# Patient Record
Sex: FEMALE | Race: WHITE | NOT HISPANIC OR LATINO | Employment: OTHER | RURAL
[De-identification: names, ages, dates, MRNs, and addresses within clinical notes are randomized per-mention and may not be internally consistent; named-entity substitution may affect disease eponyms.]

---

## 2020-11-01 ENCOUNTER — HISTORICAL (OUTPATIENT)
Dept: ADMINISTRATIVE | Facility: HOSPITAL | Age: 50
End: 2020-11-01

## 2020-11-01 LAB
ALBUMIN SERPL BCP-MCNC: 3.2 G/DL (ref 3.5–5)
ALP SERPL-CCNC: 144 U/L (ref 39–100)
ALT SERPL W P-5'-P-CCNC: 19 U/L (ref 13–56)
ANION GAP SERPL CALCULATED.3IONS-SCNC: 13 MMOL/L
ANISOCYTOSIS BLD QL SMEAR: ABNORMAL
APTT PPP: 27.2 SECONDS (ref 25.2–37.3)
AST SERPL W P-5'-P-CCNC: 18 U/L (ref 15–37)
BASOPHILS # BLD AUTO: 0.01 X10E3/UL (ref 0–0.2)
BASOPHILS NFR BLD AUTO: 0.1 % (ref 0–1)
BILIRUB DIRECT SERPL-MCNC: 0.1 MG/DL (ref 0–0.2)
BILIRUB SERPL-MCNC: 0.4 MG/DL (ref 0–1.2)
BUN SERPL-MCNC: 13 MG/DL (ref 7–18)
CALCIUM SERPL-MCNC: 10.2 MG/DL (ref 8.5–10.1)
CHLORIDE SERPL-SCNC: 99 MMOL/L (ref 98–107)
CHOLEST SERPL-MCNC: 164 MG/DL
CHOLEST/HDLC SERPL: 3.5 {RATIO}
CO2 SERPL-SCNC: 32 MMOL/L (ref 21–32)
CREAT SERPL-MCNC: 0.72 MG/DL (ref 0.55–1.02)
D DIMER PPP FEU-MCNC: 1.39 UG/ML (ref 0–0.47)
EOSINOPHIL # BLD AUTO: 0 X10E3/UL (ref 0–0.5)
EOSINOPHIL NFR BLD AUTO: 0 % (ref 1–4)
ERYTHROCYTE [DISTWIDTH] IN BLOOD BY AUTOMATED COUNT: 17.4 % (ref 11.5–14.5)
GLUCOSE SERPL-MCNC: 138 MG/DL (ref 74–106)
HCT VFR BLD AUTO: 43 % (ref 38–47)
HDLC SERPL-MCNC: 47 MG/DL
HGB BLD-MCNC: 13.5 G/DL (ref 12–16)
IMM GRANULOCYTES # BLD AUTO: 0.04 X10E3/UL (ref 0–0.04)
IMM GRANULOCYTES NFR BLD: 0.4 % (ref 0–0.4)
INR BLD: 1.03 (ref 0–3.3)
LDLC SERPL CALC-MCNC: 102 MG/DL
LYMPHOCYTES # BLD AUTO: 0.63 X10E3/UL (ref 1–4.8)
LYMPHOCYTES NFR BLD AUTO: 5.7 % (ref 27–41)
LYMPHOCYTES NFR BLD MANUAL: 9 % (ref 27–41)
MAGNESIUM SERPL-MCNC: 2.1 MG/DL (ref 1.7–2.3)
MCH RBC QN AUTO: 27.1 PG (ref 27–31)
MCHC RBC AUTO-ENTMCNC: 31.4 G/DL (ref 32–36)
MCV RBC AUTO: 86.3 FL (ref 80–96)
MONOCYTES # BLD AUTO: 0.04 X10E3/UL (ref 0–0.8)
MONOCYTES NFR BLD AUTO: 0.4 % (ref 2–6)
MPC BLD CALC-MCNC: 10.3 FL (ref 9.4–12.4)
NEUTROPHILS # BLD AUTO: 10.26 X10E3/UL (ref 1.8–7.7)
NEUTROPHILS NFR BLD AUTO: 93.4 % (ref 53–65)
NEUTS SEG NFR BLD MANUAL: 91 % (ref 50–62)
NRBC # BLD AUTO: 0 X10E3/UL (ref 0–0)
NRBC, AUTO (.00): 0 /100 (ref 0–0)
NT-PROBNP SERPL-MCNC: 376 PG/ML (ref 1–125)
OVALOCYTES BLD QL SMEAR: ABNORMAL
PLATELET # BLD AUTO: 403 X10E3/UL (ref 150–400)
PLATELET MORPHOLOGY: ABNORMAL
POTASSIUM SERPL-SCNC: 4.9 MMOL/L (ref 3.5–5.1)
PREALB SERPL NEPH-MCNC: 19 MG/DL (ref 20–40)
PROT SERPL-MCNC: 8 G/DL (ref 6.4–8.2)
PROTHROMBIN TIME: 13 SECONDS (ref 11.7–14.7)
RBC # BLD AUTO: 4.98 X10E6/UL (ref 4.2–5.4)
SARS-COV-2 RNA AMPLIFICATION, QUAL: NEGATIVE
SODIUM SERPL-SCNC: 139 MMOL/L (ref 136–145)
T4 SERPL-MCNC: 6.6 UG/DL (ref 4.8–13.9)
TARGETS BLD QL SMEAR: ABNORMAL
TRIGL SERPL-MCNC: 75 MG/DL
TROPONIN I SERPL-MCNC: <0.017 NG/ML (ref 0–0.06)
TSH SERPL DL<=0.005 MIU/L-ACNC: 0.64 UIU/ML (ref 0.36–3.74)
WBC # BLD AUTO: 10.98 X10E3/UL (ref 4.5–11)

## 2020-11-02 ENCOUNTER — HISTORICAL (OUTPATIENT)
Dept: ADMINISTRATIVE | Facility: HOSPITAL | Age: 50
End: 2020-11-02

## 2020-11-02 LAB
ALBUMIN SERPL BCP-MCNC: 3 G/DL (ref 3.5–5)
ALBUMIN/GLOB SERPL: 0.8 {RATIO}
ALP SERPL-CCNC: 108 U/L (ref 39–100)
ALT SERPL W P-5'-P-CCNC: 16 U/L (ref 13–56)
ANION GAP SERPL CALCULATED.3IONS-SCNC: 12 MMOL/L
ANISOCYTOSIS BLD QL SMEAR: ABNORMAL
AST SERPL W P-5'-P-CCNC: 18 U/L (ref 15–37)
BACTERIA #/AREA URNS HPF: ABNORMAL /HPF
BASOPHILS # BLD AUTO: 0.02 X10E3/UL (ref 0–0.2)
BASOPHILS NFR BLD AUTO: 0.1 % (ref 0–1)
BILIRUB SERPL-MCNC: 0.2 MG/DL (ref 0–1.2)
BILIRUB UR QL STRIP: NEGATIVE MG/DL
BUN SERPL-MCNC: 15 MG/DL (ref 7–18)
BUN/CREAT SERPL: 22.4
CALCIUM SERPL-MCNC: 9.2 MG/DL (ref 8.5–10.1)
CHLORIDE SERPL-SCNC: 102 MMOL/L (ref 98–107)
CLARITY UR: ABNORMAL
CLARITY UR: ABNORMAL
CO2 SERPL-SCNC: 29 MMOL/L (ref 21–32)
COLOR UR: ABNORMAL
COLOR UR: ABNORMAL
CREAT SERPL-MCNC: 0.67 MG/DL (ref 0.55–1.02)
EOSINOPHIL # BLD AUTO: 0 X10E3/UL (ref 0–0.5)
EOSINOPHIL NFR BLD AUTO: 0 % (ref 1–4)
ERYTHROCYTE [DISTWIDTH] IN BLOOD BY AUTOMATED COUNT: 17.3 % (ref 11.5–14.5)
GLOBULIN SER-MCNC: 3.9 G/DL (ref 2–4)
GLUCOSE SERPL-MCNC: 153 MG/DL (ref 74–106)
GLUCOSE UR STRIP-MCNC: NEGATIVE MG/DL
HCT VFR BLD AUTO: 36.1 % (ref 38–47)
HGB BLD-MCNC: 11.7 G/DL (ref 12–16)
IMM GRANULOCYTES # BLD AUTO: 0.13 X10E3/UL (ref 0–0.04)
IMM GRANULOCYTES NFR BLD: 0.8 % (ref 0–0.4)
KETONES UR STRIP-SCNC: NEGATIVE MG/DL
LEUKOCYTE ESTERASE UR QL STRIP: ABNORMAL LEU/UL
LYMPHOCYTES # BLD AUTO: 0.5 X10E3/UL (ref 1–4.8)
LYMPHOCYTES NFR BLD AUTO: 2.9 % (ref 27–41)
LYMPHOCYTES NFR BLD MANUAL: 2 % (ref 27–41)
MCH RBC QN AUTO: 27.8 PG (ref 27–31)
MCHC RBC AUTO-ENTMCNC: 32.4 G/DL (ref 32–36)
MCV RBC AUTO: 85.7 FL (ref 80–96)
MONOCYTES # BLD AUTO: 0.37 X10E3/UL (ref 0–0.8)
MONOCYTES NFR BLD AUTO: 2.2 % (ref 2–6)
MONOCYTES NFR BLD MANUAL: 1 % (ref 2–6)
MPC BLD CALC-MCNC: 9.2 FL (ref 9.4–12.4)
MUCOUS THREADS #/AREA URNS HPF: ABNORMAL /HPF
NEUTROPHILS # BLD AUTO: 16.01 X10E3/UL (ref 1.8–7.7)
NEUTROPHILS NFR BLD AUTO: 94 % (ref 53–65)
NEUTS BAND NFR BLD MANUAL: 7 % (ref 1–5)
NEUTS SEG NFR BLD MANUAL: 90 % (ref 50–62)
NITRITE UR QL STRIP: NEGATIVE
NRBC # BLD AUTO: 0 X10E3/UL (ref 0–0)
NRBC, AUTO (.00): 0 /100 (ref 0–0)
PH UR STRIP: 6 PH UNITS (ref 5–8)
PLATELET # BLD AUTO: 462 X10E3/UL (ref 150–400)
PLATELET MORPHOLOGY: ABNORMAL
POLYCHROMASIA BLD QL SMEAR: ABNORMAL
POTASSIUM SERPL-SCNC: 3.8 MMOL/L (ref 3.5–5.1)
PROT SERPL-MCNC: 6.9 G/DL (ref 6.4–8.2)
PROT UR QL STRIP: NEGATIVE MG/DL
RBC # BLD AUTO: 4.21 X10E6/UL (ref 4.2–5.4)
RBC # UR STRIP: ABNORMAL ERY/UL
RBC #/AREA URNS HPF: ABNORMAL /HPF (ref 0–3)
SODIUM SERPL-SCNC: 139 MMOL/L (ref 136–145)
SP GR UR STRIP: 1.02 (ref 1–1.03)
SQUAMOUS #/AREA URNS LPF: ABNORMAL /LPF
TARGETS BLD QL SMEAR: ABNORMAL
UROBILINOGEN UR STRIP-ACNC: 0.2 EU/DL
WBC # BLD AUTO: 17.03 X10E3/UL (ref 4.5–11)
WBC #/AREA URNS HPF: ABNORMAL /HPF (ref 0–5)

## 2020-11-03 ENCOUNTER — HISTORICAL (OUTPATIENT)
Dept: ADMINISTRATIVE | Facility: HOSPITAL | Age: 50
End: 2020-11-03

## 2020-11-03 LAB
ALBUMIN SERPL BCP-MCNC: 3.3 G/DL (ref 3.5–5)
ALBUMIN/GLOB SERPL: 0.8 {RATIO}
ALP SERPL-CCNC: 115 U/L (ref 39–100)
ALT SERPL W P-5'-P-CCNC: 25 U/L (ref 13–56)
ANION GAP SERPL CALCULATED.3IONS-SCNC: 12 MMOL/L
ANISOCYTOSIS BLD QL SMEAR: ABNORMAL
AST SERPL W P-5'-P-CCNC: 20 U/L (ref 15–37)
BASOPHILS # BLD AUTO: 0.02 X10E3/UL (ref 0–0.2)
BASOPHILS NFR BLD AUTO: 0.1 % (ref 0–1)
BILIRUB SERPL-MCNC: 0.2 MG/DL (ref 0–1.2)
BUN SERPL-MCNC: 19 MG/DL (ref 7–18)
BUN/CREAT SERPL: 23.2
CALCIUM SERPL-MCNC: 9.9 MG/DL (ref 8.5–10.1)
CHLORIDE SERPL-SCNC: 103 MMOL/L (ref 98–107)
CO2 SERPL-SCNC: 30 MMOL/L (ref 21–32)
CREAT SERPL-MCNC: 0.82 MG/DL (ref 0.55–1.02)
EOSINOPHIL # BLD AUTO: 0 X10E3/UL (ref 0–0.5)
EOSINOPHIL NFR BLD AUTO: 0 % (ref 1–4)
ERYTHROCYTE [DISTWIDTH] IN BLOOD BY AUTOMATED COUNT: 18.2 % (ref 11.5–14.5)
GLOBULIN SER-MCNC: 4 G/DL (ref 2–4)
GLUCOSE SERPL-MCNC: 70 MG/DL (ref 74–106)
HCT VFR BLD AUTO: 39.9 % (ref 38–47)
HGB BLD-MCNC: 12.6 G/DL (ref 12–16)
HYPOCHROMIA BLD QL SMEAR: SLIGHT
IMM GRANULOCYTES # BLD AUTO: 0.25 X10E3/UL (ref 0–0.04)
IMM GRANULOCYTES NFR BLD: 1.5 % (ref 0–0.4)
LYMPHOCYTES # BLD AUTO: 0.41 X10E3/UL (ref 1–4.8)
LYMPHOCYTES NFR BLD AUTO: 2.4 % (ref 27–41)
LYMPHOCYTES NFR BLD MANUAL: 2 % (ref 27–41)
MCH RBC QN AUTO: 27.6 PG (ref 27–31)
MCHC RBC AUTO-ENTMCNC: 31.6 G/DL (ref 32–36)
MCV RBC AUTO: 87.5 FL (ref 80–96)
MONOCYTES # BLD AUTO: 0.74 X10E3/UL (ref 0–0.8)
MONOCYTES NFR BLD AUTO: 4.4 % (ref 2–6)
MONOCYTES NFR BLD MANUAL: 3 % (ref 2–6)
MPC BLD CALC-MCNC: 9.3 FL (ref 9.4–12.4)
NEUTROPHILS # BLD AUTO: 15.56 X10E3/UL (ref 1.8–7.7)
NEUTROPHILS NFR BLD AUTO: 91.6 % (ref 53–65)
NEUTS BAND NFR BLD MANUAL: 1 % (ref 1–5)
NEUTS SEG NFR BLD MANUAL: 94 % (ref 50–62)
NRBC # BLD AUTO: 0 X10E3/UL (ref 0–0)
NRBC, AUTO (.00): 0 /100 (ref 0–0)
OVALOCYTES BLD QL SMEAR: ABNORMAL
PLATELET # BLD AUTO: 467 X10E3/UL (ref 150–400)
PLATELET MORPHOLOGY: ABNORMAL
POLYCHROMASIA BLD QL SMEAR: ABNORMAL
POTASSIUM SERPL-SCNC: 4.2 MMOL/L (ref 3.5–5.1)
PROT SERPL-MCNC: 7.3 G/DL (ref 6.4–8.2)
RBC # BLD AUTO: 4.56 X10E6/UL (ref 4.2–5.4)
SODIUM SERPL-SCNC: 141 MMOL/L (ref 136–145)
TARGETS BLD QL SMEAR: ABNORMAL
WBC # BLD AUTO: 16.98 X10E3/UL (ref 4.5–11)

## 2020-11-04 ENCOUNTER — HISTORICAL (OUTPATIENT)
Dept: ADMINISTRATIVE | Facility: HOSPITAL | Age: 50
End: 2020-11-04

## 2020-11-04 LAB
ALBUMIN SERPL BCP-MCNC: 3 G/DL (ref 3.5–5)
ALBUMIN/GLOB SERPL: 0.8 {RATIO}
ALP SERPL-CCNC: 99 U/L (ref 39–100)
ALT SERPL W P-5'-P-CCNC: 24 U/L (ref 13–56)
ANION GAP SERPL CALCULATED.3IONS-SCNC: 10 MMOL/L
ANISOCYTOSIS BLD QL SMEAR: ABNORMAL
AST SERPL W P-5'-P-CCNC: 19 U/L (ref 15–37)
BASOPHILS # BLD AUTO: 0.03 X10E3/UL (ref 0–0.2)
BASOPHILS NFR BLD AUTO: 0.2 % (ref 0–1)
BILIRUB SERPL-MCNC: 0.2 MG/DL (ref 0–1.2)
BUN SERPL-MCNC: 19 MG/DL (ref 7–18)
BUN/CREAT SERPL: 27.9
CALCIUM SERPL-MCNC: 9.2 MG/DL (ref 8.5–10.1)
CHLORIDE SERPL-SCNC: 103 MMOL/L (ref 98–107)
CO2 SERPL-SCNC: 30 MMOL/L (ref 21–32)
CREAT SERPL-MCNC: 0.68 MG/DL (ref 0.55–1.02)
EOSINOPHIL # BLD AUTO: 0 X10E3/UL (ref 0–0.5)
EOSINOPHIL NFR BLD AUTO: 0 % (ref 1–4)
ERYTHROCYTE [DISTWIDTH] IN BLOOD BY AUTOMATED COUNT: 17.5 % (ref 11.5–14.5)
GLOBULIN SER-MCNC: 3.8 G/DL (ref 2–4)
GLUCOSE SERPL-MCNC: 114 MG/DL (ref 74–106)
HCT VFR BLD AUTO: 36.7 % (ref 38–47)
HGB BLD-MCNC: 11.7 G/DL (ref 12–16)
HYPOCHROMIA BLD QL SMEAR: SLIGHT
IMM GRANULOCYTES # BLD AUTO: 0.18 X10E3/UL (ref 0–0.04)
IMM GRANULOCYTES NFR BLD: 1.4 % (ref 0–0.4)
LYMPHOCYTES # BLD AUTO: 0.67 X10E3/UL (ref 1–4.8)
LYMPHOCYTES NFR BLD AUTO: 5.2 % (ref 27–41)
LYMPHOCYTES NFR BLD MANUAL: 3 % (ref 27–41)
MCH RBC QN AUTO: 27.7 PG (ref 27–31)
MCHC RBC AUTO-ENTMCNC: 31.9 G/DL (ref 32–36)
MCV RBC AUTO: 86.8 FL (ref 80–96)
MONOCYTES # BLD AUTO: 0.33 X10E3/UL (ref 0–0.8)
MONOCYTES NFR BLD AUTO: 2.6 % (ref 2–6)
MONOCYTES NFR BLD MANUAL: 4 % (ref 2–6)
MPC BLD CALC-MCNC: 9.3 FL (ref 9.4–12.4)
NEUTROPHILS # BLD AUTO: 11.69 X10E3/UL (ref 1.8–7.7)
NEUTROPHILS NFR BLD AUTO: 90.6 % (ref 53–65)
NEUTS SEG NFR BLD MANUAL: 93 % (ref 50–62)
NRBC # BLD AUTO: 0 X10E3/UL (ref 0–0)
NRBC, AUTO (.00): 0 /100 (ref 0–0)
PLATELET # BLD AUTO: 433 X10E3/UL (ref 150–400)
PLATELET MORPHOLOGY: ABNORMAL
POTASSIUM SERPL-SCNC: 4 MMOL/L (ref 3.5–5.1)
PROT SERPL-MCNC: 6.8 G/DL (ref 6.4–8.2)
RBC # BLD AUTO: 4.23 X10E6/UL (ref 4.2–5.4)
REPORT: NORMAL
SODIUM SERPL-SCNC: 139 MMOL/L (ref 136–145)
TARGETS BLD QL SMEAR: ABNORMAL
WBC # BLD AUTO: 12.9 X10E3/UL (ref 4.5–11)

## 2020-11-06 LAB
REPORT: NORMAL

## 2021-01-01 ENCOUNTER — OFFICE VISIT (OUTPATIENT)
Dept: FAMILY MEDICINE | Facility: CLINIC | Age: 51
End: 2021-01-01
Payer: MEDICAID

## 2021-01-01 ENCOUNTER — TELEPHONE (OUTPATIENT)
Dept: PULMONOLOGY | Facility: CLINIC | Age: 51
End: 2021-01-01
Payer: MEDICAID

## 2021-01-01 ENCOUNTER — OFFICE VISIT (OUTPATIENT)
Dept: PULMONOLOGY | Facility: CLINIC | Age: 51
End: 2021-01-01
Payer: MEDICAID

## 2021-01-01 VITALS
OXYGEN SATURATION: 96 % | RESPIRATION RATE: 16 BRPM | WEIGHT: 100 LBS | DIASTOLIC BLOOD PRESSURE: 68 MMHG | HEART RATE: 117 BPM | BODY MASS INDEX: 17.07 KG/M2 | HEIGHT: 64 IN | TEMPERATURE: 99 F | SYSTOLIC BLOOD PRESSURE: 131 MMHG

## 2021-01-01 VITALS
WEIGHT: 100 LBS | RESPIRATION RATE: 20 BRPM | BODY MASS INDEX: 17.07 KG/M2 | HEIGHT: 64 IN | DIASTOLIC BLOOD PRESSURE: 60 MMHG | HEART RATE: 130 BPM | OXYGEN SATURATION: 96 % | SYSTOLIC BLOOD PRESSURE: 90 MMHG

## 2021-01-01 DIAGNOSIS — M26.609 TMJ (TEMPOROMANDIBULAR JOINT DISORDER): ICD-10-CM

## 2021-01-01 DIAGNOSIS — Z23 NEED FOR INFLUENZA VACCINATION: ICD-10-CM

## 2021-01-01 DIAGNOSIS — J44.9 CHRONIC OBSTRUCTIVE PULMONARY DISEASE, UNSPECIFIED COPD TYPE: ICD-10-CM

## 2021-01-01 DIAGNOSIS — Z99.81 OXYGEN DEPENDENT: ICD-10-CM

## 2021-01-01 DIAGNOSIS — Z12.11 SCREENING FOR MALIGNANT NEOPLASM OF COLON: ICD-10-CM

## 2021-01-01 DIAGNOSIS — F17.200 SMOKER: ICD-10-CM

## 2021-01-01 DIAGNOSIS — Z11.59 ENCOUNTER FOR HEPATITIS C SCREENING TEST FOR LOW RISK PATIENT: Primary | ICD-10-CM

## 2021-01-01 DIAGNOSIS — Z12.31 SCREENING MAMMOGRAM FOR BREAST CANCER: ICD-10-CM

## 2021-01-01 DIAGNOSIS — Z72.0 TOBACCO USE: ICD-10-CM

## 2021-01-01 PROCEDURE — 90471 FLU VACCINE (QUAD) GREATER THAN OR EQUAL TO 3YO PRESERVATIVE FREE IM: ICD-10-PCS | Mod: ,,, | Performed by: FAMILY MEDICINE

## 2021-01-01 PROCEDURE — 90471 IMMUNIZATION ADMIN: CPT | Mod: ,,, | Performed by: FAMILY MEDICINE

## 2021-01-01 PROCEDURE — 99204 OFFICE O/P NEW MOD 45 MIN: CPT | Mod: S$PBB,,, | Performed by: INTERNAL MEDICINE

## 2021-01-01 PROCEDURE — 90686 FLU VACCINE (QUAD) GREATER THAN OR EQUAL TO 3YO PRESERVATIVE FREE IM: ICD-10-PCS | Mod: ,,, | Performed by: FAMILY MEDICINE

## 2021-01-01 PROCEDURE — 99203 PR OFFICE/OUTPT VISIT, NEW, LEVL III, 30-44 MIN: ICD-10-PCS | Mod: 25,,, | Performed by: FAMILY MEDICINE

## 2021-01-01 PROCEDURE — 99204 PR OFFICE/OUTPT VISIT, NEW, LEVL IV, 45-59 MIN: ICD-10-PCS | Mod: S$PBB,,, | Performed by: INTERNAL MEDICINE

## 2021-01-01 PROCEDURE — 99214 OFFICE O/P EST MOD 30 MIN: CPT | Mod: PBBFAC | Performed by: INTERNAL MEDICINE

## 2021-01-01 PROCEDURE — 99203 OFFICE O/P NEW LOW 30 MIN: CPT | Mod: 25,,, | Performed by: FAMILY MEDICINE

## 2021-01-01 PROCEDURE — 90686 IIV4 VACC NO PRSV 0.5 ML IM: CPT | Mod: ,,, | Performed by: FAMILY MEDICINE

## 2021-01-01 RX ORDER — THEOPHYLLINE 300 MG/1
300 TABLET, EXTENDED RELEASE ORAL 2 TIMES DAILY
Qty: 60 TABLET | Refills: 5 | Status: SHIPPED | OUTPATIENT
Start: 2021-01-01

## 2021-01-01 RX ORDER — OXYMETAZOLINE HCL 0.05 %
2 SPRAY, NON-AEROSOL (ML) NASAL
COMMUNITY

## 2021-01-01 RX ORDER — MONTELUKAST SODIUM 10 MG/1
10 TABLET ORAL NIGHTLY
Qty: 30 TABLET | Refills: 6 | Status: SHIPPED | OUTPATIENT
Start: 2021-01-01

## 2021-01-01 RX ORDER — IPRATROPIUM BROMIDE AND ALBUTEROL SULFATE 2.5; .5 MG/3ML; MG/3ML
SOLUTION RESPIRATORY (INHALATION)
Qty: 360 ML | Refills: 3 | Status: SHIPPED | OUTPATIENT
Start: 2021-01-01 | End: 2022-01-01 | Stop reason: SDUPTHER

## 2021-01-01 RX ORDER — HYDROXYZINE PAMOATE 50 MG/1
50 CAPSULE ORAL EVERY 8 HOURS PRN
COMMUNITY
End: 2021-01-01 | Stop reason: SDUPTHER

## 2021-01-01 RX ORDER — FUROSEMIDE 20 MG/1
20 TABLET ORAL DAILY PRN
Qty: 30 TABLET | Refills: 5 | Status: SHIPPED | OUTPATIENT
Start: 2021-01-01

## 2021-01-01 RX ORDER — FLUTICASONE PROPIONATE 50 MCG
2 SPRAY, SUSPENSION (ML) NASAL
COMMUNITY

## 2021-01-01 RX ORDER — FLUTICASONE PROPIONATE AND SALMETEROL 500; 50 UG/1; UG/1
1 POWDER RESPIRATORY (INHALATION) 2 TIMES DAILY
COMMUNITY

## 2021-01-01 RX ORDER — MONTELUKAST SODIUM 10 MG/1
10 TABLET ORAL NIGHTLY
COMMUNITY
End: 2021-01-01 | Stop reason: SDUPTHER

## 2021-01-01 RX ORDER — FUROSEMIDE 20 MG/1
20 TABLET ORAL DAILY PRN
COMMUNITY
End: 2021-01-01 | Stop reason: SDUPTHER

## 2021-01-01 RX ORDER — PREDNISONE 20 MG/1
20 TABLET ORAL DAILY
Qty: 3 TABLET | Refills: 0 | Status: SHIPPED | OUTPATIENT
Start: 2021-01-01 | End: 2021-01-01

## 2021-01-01 RX ORDER — PHENYLPROPANOLAMINE/CLEMASTINE 75-1.34MG
2 TABLET, EXTENDED RELEASE ORAL
COMMUNITY

## 2021-01-01 RX ORDER — HYDROXYZINE PAMOATE 50 MG/1
50 CAPSULE ORAL EVERY 8 HOURS PRN
Qty: 270 CAPSULE | Refills: 1 | Status: SHIPPED | OUTPATIENT
Start: 2021-01-01

## 2021-04-16 ENCOUNTER — HISTORICAL (OUTPATIENT)
Dept: ADMINISTRATIVE | Facility: HOSPITAL | Age: 51
End: 2021-04-16

## 2021-04-16 LAB
ALBUMIN SERPL BCP-MCNC: 3.6 G/DL (ref 3.4–5)
ALBUMIN/GLOB SERPL: 1 {RATIO}
ALP SERPL-CCNC: 123 U/L (ref 50–136)
ALT SERPL W P-5'-P-CCNC: 24 U/L (ref 12–78)
ANION GAP SERPL CALCULATED.3IONS-SCNC: 15 MMOL/L
AST SERPL W P-5'-P-CCNC: 18 U/L (ref 15–37)
BASOPHILS # BLD AUTO: 0.03 X10E3/UL (ref 0–0.2)
BASOPHILS NFR BLD AUTO: 0.2 % (ref 0–1)
BILIRUB SERPL-MCNC: 0.3 MG/DL (ref 0.2–1)
BUN SERPL-MCNC: 8 MG/DL (ref 7–18)
CALCIUM SERPL-MCNC: 8.7 MG/DL (ref 8.5–10.1)
CHLORIDE SERPL-SCNC: 99 MMOL/L (ref 101–111)
CO2 SERPL-SCNC: 28 MMOL/L (ref 21–32)
CREAT SERPL-MCNC: 0.5 MG/DL (ref 0.6–1.3)
D DIMER PPP FEU-MCNC: 1.81 UG/ML (ref 0–0.47)
EOSINOPHIL # BLD AUTO: 0.1 X10E3/UL (ref 0–0.5)
EOSINOPHIL NFR BLD AUTO: 0.6 % (ref 1–4)
ERYTHROCYTE [DISTWIDTH] IN BLOOD BY AUTOMATED COUNT: 19.1 % (ref 11.5–14.5)
FLUAV AG UPPER RESP QL IA.RAPID: NEGATIVE
FLUBV AG UPPER RESP QL IA.RAPID: NEGATIVE
GLOBULIN SER-MCNC: 3.7 G/DL
GLUCOSE SERPL-MCNC: 127 MG/DL (ref 74–106)
HCO3 UR-SCNC: 35 MMOL/L (ref 21–28)
HCT VFR BLD AUTO: 42.2 % (ref 38–47)
HGB BLD-MCNC: 13.7 G/DL (ref 12–16)
LACTATE SERPL-SCNC: 2.3 MMOL/L (ref 0.4–2)
LACTATE SERPL-SCNC: 2.7 MMOL/L (ref 0.4–2)
LYMPHOCYTES # BLD AUTO: 2.56 X10E3/UL (ref 1–4.8)
LYMPHOCYTES NFR BLD AUTO: 16.1 % (ref 27–41)
MAGNESIUM SERPL-MCNC: 1.9 MG/DL (ref 1.8–2.4)
MCH RBC QN AUTO: 26.9 PG (ref 27–31)
MCHC RBC AUTO-ENTMCNC: 32.5 G/DL (ref 32–36)
MCV RBC AUTO: 83 FL (ref 80–96)
MONOCYTES # BLD AUTO: 1.25 X10E3/UL (ref 0–0.8)
MONOCYTES NFR BLD AUTO: 7.8 % (ref 2–6)
MPC BLD CALC-MCNC: 10.1 FL (ref 9.4–12.4)
NEUTROPHILS # BLD AUTO: 12 X10E3/UL (ref 1.8–7.7)
NEUTROPHILS NFR BLD AUTO: 75.3 % (ref 53–65)
PCO2 BLDA: 59 MM HG (ref 35–48)
PH SMN: 7.38 PH UNITS (ref 7.35–7.45)
PLATELET # BLD AUTO: 311 X10E3/UL (ref 150–400)
PO2 BLDA: 71 MM HG (ref 83–108)
POC BASE EXCESS ARTERIAL: 7.9 MMOL/L (ref -2–3)
POC SATURATED O2: 94 % (ref 95–98)
POTASSIUM SERPL-SCNC: 4.3 MMOL/L (ref 3.6–5)
PROT SERPL-MCNC: 7.3 G/DL (ref 6.4–8.2)
RBC # BLD AUTO: 5.1 X10E6/UL (ref 4.2–5.4)
SARS-COV+SARS-COV-2 AG RESP QL IA.RAPID: NEGATIVE
SODIUM SERPL-SCNC: 138 MMOL/L (ref 135–145)
TROPONIN I SERPL-MCNC: 0 NG/ML (ref 0–0.06)
WBC # BLD AUTO: 15.94 X10E3/UL (ref 4.5–11)

## 2021-04-17 ENCOUNTER — HISTORICAL (OUTPATIENT)
Dept: ADMINISTRATIVE | Facility: HOSPITAL | Age: 51
End: 2021-04-17

## 2021-04-17 LAB
ANION GAP SERPL CALCULATED.3IONS-SCNC: 9 MMOL/L
BASOPHILS # BLD AUTO: 0.01 X10E3/UL (ref 0–0.2)
BASOPHILS NFR BLD AUTO: 0.1 % (ref 0–1)
BUN SERPL-MCNC: 17 MG/DL (ref 7–18)
CALCIUM SERPL-MCNC: 9.6 MG/DL (ref 8.5–10.1)
CHLORIDE SERPL-SCNC: 100 MMOL/L (ref 101–111)
CO2 SERPL-SCNC: 32 MMOL/L (ref 21–32)
CREAT SERPL-MCNC: 0.6 MG/DL (ref 0.6–1.3)
EOSINOPHIL # BLD AUTO: 0 X10E3/UL (ref 0–0.5)
EOSINOPHIL NFR BLD AUTO: 0 % (ref 1–4)
ERYTHROCYTE [DISTWIDTH] IN BLOOD BY AUTOMATED COUNT: 18.3 % (ref 11.5–14.5)
GLUCOSE SERPL-MCNC: 125 MG/DL (ref 74–106)
HCT VFR BLD AUTO: 38.1 % (ref 38–47)
HGB BLD-MCNC: 12.5 G/DL (ref 12–16)
LYMPHOCYTES # BLD AUTO: 0.66 X10E3/UL (ref 1–4.8)
LYMPHOCYTES NFR BLD AUTO: 5.8 % (ref 27–41)
LYMPHOCYTES NFR BLD MANUAL: 7 % (ref 27–41)
MCH RBC QN AUTO: 27.4 PG (ref 27–31)
MCHC RBC AUTO-ENTMCNC: 32.8 G/DL (ref 32–36)
MCV RBC AUTO: 84 FL (ref 80–96)
MONOCYTES # BLD AUTO: 0.3 X10E3/UL (ref 0–0.8)
MONOCYTES NFR BLD AUTO: 2.6 % (ref 2–6)
MONOCYTES NFR BLD MANUAL: 4 % (ref 2–6)
MPC BLD CALC-MCNC: 9 FL (ref 9.4–12.4)
NEUTROPHILS # BLD AUTO: 10.41 X10E3/UL (ref 1.8–7.7)
NEUTROPHILS NFR BLD AUTO: 91.5 % (ref 53–65)
NEUTS SEG NFR BLD MANUAL: 89 % (ref 50–62)
PLATELET # BLD AUTO: 418 X10E3/UL (ref 150–400)
POTASSIUM SERPL-SCNC: 4.5 MMOL/L (ref 3.6–5)
RBC # BLD AUTO: 4.56 X10E6/UL (ref 4.2–5.4)
SODIUM SERPL-SCNC: 136 MMOL/L (ref 135–145)
THEOPHYLLINE BLD-MCNC: 2.7 UG/ML (ref 10–20)
TSH SERPL DL<=0.005 MIU/L-ACNC: 0.51 UIU/ML (ref 0.36–3.74)
WBC # BLD AUTO: 11.38 X10E3/UL (ref 4.5–11)

## 2021-04-18 ENCOUNTER — HISTORICAL (OUTPATIENT)
Dept: ADMINISTRATIVE | Facility: HOSPITAL | Age: 51
End: 2021-04-18

## 2021-04-18 LAB
ANION GAP SERPL CALCULATED.3IONS-SCNC: 10 MMOL/L
BASOPHILS # BLD AUTO: 0.01 X10E3/UL (ref 0–0.2)
BASOPHILS NFR BLD AUTO: 0.1 % (ref 0–1)
BUN SERPL-MCNC: 19 MG/DL (ref 7–18)
CALCIUM SERPL-MCNC: 8.8 MG/DL (ref 8.5–10.1)
CHLORIDE SERPL-SCNC: 103 MMOL/L (ref 101–111)
CO2 SERPL-SCNC: 30 MMOL/L (ref 21–32)
CREAT SERPL-MCNC: 0.7 MG/DL (ref 0.6–1.3)
EOSINOPHIL # BLD AUTO: 0 X10E3/UL (ref 0–0.5)
EOSINOPHIL NFR BLD AUTO: 0 % (ref 1–4)
ERYTHROCYTE [DISTWIDTH] IN BLOOD BY AUTOMATED COUNT: 18.3 % (ref 11.5–14.5)
GLUCOSE SERPL-MCNC: 99 MG/DL (ref 74–106)
HCT VFR BLD AUTO: 35.2 % (ref 38–47)
HGB BLD-MCNC: 11.4 G/DL (ref 12–16)
LYMPHOCYTES # BLD AUTO: 0.75 X10E3/UL (ref 1–4.8)
LYMPHOCYTES NFR BLD AUTO: 5.6 % (ref 27–41)
LYMPHOCYTES NFR BLD MANUAL: 5 % (ref 27–41)
MCH RBC QN AUTO: 27.3 PG (ref 27–31)
MCHC RBC AUTO-ENTMCNC: 32.4 G/DL (ref 32–36)
MCV RBC AUTO: 84 FL (ref 80–96)
MONOCYTES # BLD AUTO: 0.54 X10E3/UL (ref 0–0.8)
MONOCYTES NFR BLD AUTO: 4 % (ref 2–6)
MONOCYTES NFR BLD MANUAL: 2 % (ref 2–6)
MPC BLD CALC-MCNC: 9.1 FL (ref 9.4–12.4)
NEUTROPHILS # BLD AUTO: 12.13 X10E3/UL (ref 1.8–7.7)
NEUTROPHILS NFR BLD AUTO: 90.3 % (ref 53–65)
NEUTS SEG NFR BLD MANUAL: 93 % (ref 50–62)
PLATELET # BLD AUTO: 427 X10E3/UL (ref 150–400)
POTASSIUM SERPL-SCNC: 3.8 MMOL/L (ref 3.6–5)
RBC # BLD AUTO: 4.17 X10E6/UL (ref 4.2–5.4)
SODIUM SERPL-SCNC: 139 MMOL/L (ref 135–145)
WBC # BLD AUTO: 13.43 X10E3/UL (ref 4.5–11)

## 2021-04-19 ENCOUNTER — HISTORICAL (OUTPATIENT)
Dept: ADMINISTRATIVE | Facility: HOSPITAL | Age: 51
End: 2021-04-19

## 2021-04-19 LAB
ANION GAP SERPL CALCULATED.3IONS-SCNC: 10 MMOL/L
BASOPHILS # BLD AUTO: 0 X10E3/UL (ref 0–0.2)
BASOPHILS NFR BLD AUTO: 0 % (ref 0–1)
BUN SERPL-MCNC: 15 MG/DL (ref 7–18)
CALCIUM SERPL-MCNC: 8.7 MG/DL (ref 8.5–10.1)
CHLORIDE SERPL-SCNC: 102 MMOL/L (ref 101–111)
CO2 SERPL-SCNC: 28 MMOL/L (ref 21–32)
CREAT SERPL-MCNC: 0.6 MG/DL (ref 0.6–1.3)
EOSINOPHIL # BLD AUTO: 0 X10E3/UL (ref 0–0.5)
EOSINOPHIL NFR BLD AUTO: 0 % (ref 1–4)
ERYTHROCYTE [DISTWIDTH] IN BLOOD BY AUTOMATED COUNT: 18.4 % (ref 11.5–14.5)
GLUCOSE SERPL-MCNC: 98 MG/DL (ref 74–106)
HCT VFR BLD AUTO: 34.2 % (ref 38–47)
HGB BLD-MCNC: 10.9 G/DL (ref 12–16)
LYMPHOCYTES # BLD AUTO: 1.24 X10E3/UL (ref 1–4.8)
LYMPHOCYTES NFR BLD AUTO: 13.8 % (ref 27–41)
MCH RBC QN AUTO: 27.3 PG (ref 27–31)
MCHC RBC AUTO-ENTMCNC: 31.9 G/DL (ref 32–36)
MCV RBC AUTO: 86 FL (ref 80–96)
MONOCYTES # BLD AUTO: 0.54 X10E3/UL (ref 0–0.8)
MONOCYTES NFR BLD AUTO: 6 % (ref 2–6)
MPC BLD CALC-MCNC: 9.1 FL (ref 9.4–12.4)
NEUTROPHILS # BLD AUTO: 7.23 X10E3/UL (ref 1.8–7.7)
NEUTROPHILS NFR BLD AUTO: 80.2 % (ref 53–65)
PLATELET # BLD AUTO: 383 X10E3/UL (ref 150–400)
POTASSIUM SERPL-SCNC: 3.7 MMOL/L (ref 3.6–5)
RBC # BLD AUTO: 4 X10E6/UL (ref 4.2–5.4)
SODIUM SERPL-SCNC: 136 MMOL/L (ref 135–145)
WBC # BLD AUTO: 9.01 X10E3/UL (ref 4.5–11)

## 2021-04-22 VITALS — WEIGHT: 110 LBS | HEIGHT: 64 IN | BODY MASS INDEX: 18.78 KG/M2

## 2021-04-22 DIAGNOSIS — J44.9 CHRONIC OBSTRUCTIVE PULMONARY DISEASE, UNSPECIFIED COPD TYPE: ICD-10-CM

## 2021-04-22 DIAGNOSIS — R09.02 HYPOXIA: ICD-10-CM

## 2021-04-22 DIAGNOSIS — A31.0 MYCOBACTERIUM AVIUM-INTRACELLULARE INFECTION: Primary | ICD-10-CM

## 2021-04-22 DIAGNOSIS — A31.0 PULMONARY MYCOBACTERIUM AVIUM COMPLEX (MAC) INFECTION: Primary | ICD-10-CM

## 2021-04-22 LAB
REPORT: NORMAL

## 2021-04-22 RX ORDER — TIOTROPIUM BROMIDE 18 UG/1
18 CAPSULE ORAL; RESPIRATORY (INHALATION) DAILY
COMMUNITY

## 2021-04-22 RX ORDER — CLARITHROMYCIN 500 MG/1
1000 TABLET, FILM COATED, EXTENDED RELEASE ORAL DAILY
COMMUNITY
End: 2021-01-01

## 2021-04-22 RX ORDER — ETHAMBUTOL HYDROCHLORIDE 400 MG/1
800 TABLET, FILM COATED ORAL DAILY
COMMUNITY

## 2021-04-22 RX ORDER — LEVOFLOXACIN 500 MG/1
500 TABLET, FILM COATED ORAL DAILY
COMMUNITY
End: 2021-01-01

## 2021-04-22 RX ORDER — PREDNISONE 20 MG/1
20 TABLET ORAL DAILY
COMMUNITY
End: 2021-01-01 | Stop reason: SDUPTHER

## 2021-04-22 RX ORDER — IBUPROFEN 200 MG
1 TABLET ORAL
COMMUNITY

## 2021-04-22 RX ORDER — THEOPHYLLINE 300 MG/1
300 TABLET, EXTENDED RELEASE ORAL 2 TIMES DAILY
COMMUNITY
End: 2021-01-01 | Stop reason: SDUPTHER

## 2021-04-22 RX ORDER — FLUTICASONE PROPIONATE AND SALMETEROL 250; 50 UG/1; UG/1
1 POWDER RESPIRATORY (INHALATION) 2 TIMES DAILY
COMMUNITY
End: 2021-01-01

## 2021-04-22 RX ORDER — RIFAMPIN 300 MG/1
600 CAPSULE ORAL DAILY
COMMUNITY
End: 2021-01-01

## 2021-04-22 RX ORDER — VARENICLINE TARTRATE 1 MG/1
1 TABLET, FILM COATED ORAL 2 TIMES DAILY
COMMUNITY
End: 2021-01-01

## 2021-05-04 RX ORDER — IPRATROPIUM BROMIDE AND ALBUTEROL SULFATE 2.5; .5 MG/3ML; MG/3ML
3 SOLUTION RESPIRATORY (INHALATION) DAILY PRN
COMMUNITY
Start: 2021-03-01 | End: 2021-09-08

## 2021-05-04 RX ORDER — IPRATROPIUM BROMIDE 0.5 MG/2.5ML
2.5 SOLUTION RESPIRATORY (INHALATION) DAILY
COMMUNITY
Start: 2021-04-01 | End: 2021-09-08

## 2021-05-05 ENCOUNTER — TELEPHONE (OUTPATIENT)
Dept: PULMONOLOGY | Facility: CLINIC | Age: 51
End: 2021-05-05

## 2021-08-26 ENCOUNTER — TELEPHONE (OUTPATIENT)
Dept: PULMONOLOGY | Facility: CLINIC | Age: 51
End: 2021-08-26

## 2021-09-24 ENCOUNTER — TELEPHONE (OUTPATIENT)
Dept: PULMONOLOGY | Facility: CLINIC | Age: 51
End: 2021-09-24

## 2021-10-08 ENCOUNTER — TELEPHONE (OUTPATIENT)
Dept: PULMONOLOGY | Facility: CLINIC | Age: 51
End: 2021-10-08

## 2021-10-08 DIAGNOSIS — J44.9 CHRONIC OBSTRUCTIVE PULMONARY DISEASE, UNSPECIFIED COPD TYPE: Primary | ICD-10-CM

## 2021-10-08 DIAGNOSIS — R09.02 HYPOXIA: ICD-10-CM

## 2021-10-08 DIAGNOSIS — A31.0 MYCOBACTERIUM AVIUM-INTRACELLULARE INFECTION: ICD-10-CM

## 2021-11-10 PROBLEM — R00.0 TACHYCARDIA: Status: ACTIVE | Noted: 2019-04-04

## 2021-11-10 PROBLEM — J44.9 CHRONIC OBSTRUCTIVE LUNG DISEASE: Status: ACTIVE | Noted: 2019-04-04

## 2021-11-10 PROBLEM — F41.1 GENERALIZED ANXIETY DISORDER: Status: ACTIVE | Noted: 2019-04-04

## 2021-11-10 PROBLEM — E55.9 VITAMIN D DEFICIENCY: Status: ACTIVE | Noted: 2020-05-07

## 2021-11-10 PROBLEM — F39 UNSPECIFIED MOOD (AFFECTIVE) DISORDER: Status: ACTIVE | Noted: 2017-08-02

## 2021-11-10 PROBLEM — R25.2 SPASM: Status: ACTIVE | Noted: 2019-04-04

## 2021-11-17 PROBLEM — Z72.0 TOBACCO USE: Status: ACTIVE | Noted: 2021-01-01

## 2022-01-01 ENCOUNTER — HOSPITAL ENCOUNTER (INPATIENT)
Facility: HOSPITAL | Age: 52
LOS: 2 days | Discharge: LEFT BEFORE DISCHARGE | End: 2022-04-17
Attending: FAMILY MEDICINE | Admitting: FAMILY MEDICINE
Payer: MEDICAID

## 2022-01-01 ENCOUNTER — TELEPHONE (OUTPATIENT)
Dept: PULMONOLOGY | Facility: CLINIC | Age: 52
End: 2022-01-01
Payer: MEDICAID

## 2022-01-01 VITALS
HEART RATE: 113 BPM | DIASTOLIC BLOOD PRESSURE: 70 MMHG | WEIGHT: 97.19 LBS | RESPIRATION RATE: 22 BRPM | HEIGHT: 64 IN | TEMPERATURE: 98 F | BODY MASS INDEX: 16.59 KG/M2 | SYSTOLIC BLOOD PRESSURE: 104 MMHG | OXYGEN SATURATION: 100 %

## 2022-01-01 DIAGNOSIS — J18.9 PNEUMONIA OF LEFT UPPER LOBE DUE TO INFECTIOUS ORGANISM: ICD-10-CM

## 2022-01-01 DIAGNOSIS — J44.9 CHRONIC OBSTRUCTIVE PULMONARY DISEASE, UNSPECIFIED COPD TYPE: ICD-10-CM

## 2022-01-01 DIAGNOSIS — J96.01 SEPSIS WITH ACUTE HYPOXIC RESPIRATORY FAILURE WITHOUT SEPTIC SHOCK, DUE TO UNSPECIFIED ORGANISM: ICD-10-CM

## 2022-01-01 DIAGNOSIS — J96.01 ACUTE HYPOXEMIC RESPIRATORY FAILURE: ICD-10-CM

## 2022-01-01 DIAGNOSIS — J18.9 PNEUMONIA OF LEFT LUNG DUE TO INFECTIOUS ORGANISM, UNSPECIFIED PART OF LUNG: Primary | ICD-10-CM

## 2022-01-01 DIAGNOSIS — I49.9 ARRHYTHMIA: ICD-10-CM

## 2022-01-01 DIAGNOSIS — J44.1 COPD EXACERBATION: ICD-10-CM

## 2022-01-01 DIAGNOSIS — R65.20 SEPSIS WITH ACUTE HYPOXIC RESPIRATORY FAILURE WITHOUT SEPTIC SHOCK, DUE TO UNSPECIFIED ORGANISM: ICD-10-CM

## 2022-01-01 DIAGNOSIS — A41.9 SEPSIS WITH ACUTE HYPOXIC RESPIRATORY FAILURE WITHOUT SEPTIC SHOCK, DUE TO UNSPECIFIED ORGANISM: ICD-10-CM

## 2022-01-01 LAB
ALBUMIN SERPL BCP-MCNC: 2.3 G/DL (ref 3.5–5)
ALBUMIN/GLOB SERPL: 0.5 {RATIO}
ALP SERPL-CCNC: 183 U/L (ref 41–108)
ALT SERPL W P-5'-P-CCNC: 21 U/L (ref 13–56)
ANION GAP SERPL CALCULATED.3IONS-SCNC: 8 MMOL/L (ref 7–16)
ANION GAP SERPL CALCULATED.3IONS-SCNC: 9 MMOL/L (ref 7–16)
ANION GAP SERPL CALCULATED.3IONS-SCNC: 9 MMOL/L (ref 7–16)
AST SERPL W P-5'-P-CCNC: 18 U/L (ref 15–37)
BACTERIA BLD CULT: NORMAL
BACTERIA BLD CULT: NORMAL
BASOPHILS # BLD AUTO: 0.01 K/UL (ref 0–0.2)
BASOPHILS # BLD AUTO: 0.01 K/UL (ref 0–0.2)
BASOPHILS # BLD AUTO: 0.02 K/UL (ref 0–0.2)
BASOPHILS NFR BLD AUTO: 0.1 % (ref 0–1)
BILIRUB SERPL-MCNC: 0.2 MG/DL (ref 0–1.2)
BUN SERPL-MCNC: 15 MG/DL (ref 7–18)
BUN SERPL-MCNC: 17 MG/DL (ref 7–18)
BUN SERPL-MCNC: 19 MG/DL (ref 7–18)
BUN/CREAT SERPL: 27 (ref 6–20)
BUN/CREAT SERPL: 33 (ref 6–20)
BUN/CREAT SERPL: 38 (ref 6–20)
CALCIUM SERPL-MCNC: 8.6 MG/DL (ref 8.5–10.1)
CALCIUM SERPL-MCNC: 8.7 MG/DL (ref 8.5–10.1)
CALCIUM SERPL-MCNC: 8.9 MG/DL (ref 8.5–10.1)
CHLORIDE SERPL-SCNC: 87 MMOL/L (ref 98–107)
CHLORIDE SERPL-SCNC: 93 MMOL/L (ref 98–107)
CHLORIDE SERPL-SCNC: 95 MMOL/L (ref 98–107)
CO2 SERPL-SCNC: 35 MMOL/L (ref 21–32)
CO2 SERPL-SCNC: 37 MMOL/L (ref 21–32)
CO2 SERPL-SCNC: 42 MMOL/L (ref 21–32)
CREAT SERPL-MCNC: 0.5 MG/DL (ref 0.55–1.02)
CREAT SERPL-MCNC: 0.51 MG/DL (ref 0.55–1.02)
CREAT SERPL-MCNC: 0.55 MG/DL (ref 0.55–1.02)
D DIMER PPP FEU-MCNC: 6.16 ΜG/ML (ref 0–0.47)
DIFFERENTIAL METHOD BLD: ABNORMAL
EOSINOPHIL # BLD AUTO: 0 K/UL (ref 0–0.5)
EOSINOPHIL # BLD AUTO: 0.01 K/UL (ref 0–0.5)
EOSINOPHIL # BLD AUTO: 0.01 K/UL (ref 0–0.5)
EOSINOPHIL NFR BLD AUTO: 0 % (ref 1–4)
EOSINOPHIL NFR BLD AUTO: 0.1 % (ref 1–4)
EOSINOPHIL NFR BLD AUTO: 0.1 % (ref 1–4)
ERYTHROCYTE [DISTWIDTH] IN BLOOD BY AUTOMATED COUNT: 15 % (ref 11.5–14.5)
ERYTHROCYTE [DISTWIDTH] IN BLOOD BY AUTOMATED COUNT: 15 % (ref 11.5–14.5)
ERYTHROCYTE [DISTWIDTH] IN BLOOD BY AUTOMATED COUNT: 15.4 % (ref 11.5–14.5)
FLUAV AG UPPER RESP QL IA.RAPID: NEGATIVE
FLUBV AG UPPER RESP QL IA.RAPID: NEGATIVE
GLOBULIN SER-MCNC: 4.7 G/DL (ref 2–4)
GLUCOSE SERPL-MCNC: 112 MG/DL (ref 74–106)
GLUCOSE SERPL-MCNC: 128 MG/DL (ref 74–106)
GLUCOSE SERPL-MCNC: 99 MG/DL (ref 74–106)
HCT VFR BLD AUTO: 34.7 % (ref 38–47)
HCT VFR BLD AUTO: 35 % (ref 38–47)
HCT VFR BLD AUTO: 39.7 % (ref 38–47)
HGB BLD-MCNC: 11.2 G/DL (ref 12–16)
HGB BLD-MCNC: 11.3 G/DL (ref 12–16)
HGB BLD-MCNC: 12.3 G/DL (ref 12–16)
LACTATE SERPL-SCNC: 1 MMOL/L (ref 0.4–2)
LACTATE SERPL-SCNC: 2.2 MMOL/L (ref 0.4–2)
LYMPHOCYTES # BLD AUTO: 0.34 K/UL (ref 1–4.8)
LYMPHOCYTES # BLD AUTO: 0.35 K/UL (ref 1–4.8)
LYMPHOCYTES # BLD AUTO: 0.48 K/UL (ref 1–4.8)
LYMPHOCYTES NFR BLD AUTO: 1.8 % (ref 27–41)
LYMPHOCYTES NFR BLD AUTO: 2.6 % (ref 27–41)
LYMPHOCYTES NFR BLD AUTO: 3.1 % (ref 27–41)
LYMPHOCYTES NFR BLD MANUAL: 1 % (ref 27–41)
LYMPHOCYTES NFR BLD MANUAL: 2 % (ref 27–41)
LYMPHOCYTES NFR BLD MANUAL: 2 % (ref 27–41)
MCH RBC QN AUTO: 28.7 PG (ref 27–31)
MCH RBC QN AUTO: 28.8 PG (ref 27–31)
MCH RBC QN AUTO: 29 PG (ref 27–31)
MCHC RBC AUTO-ENTMCNC: 31 G/DL (ref 32–36)
MCHC RBC AUTO-ENTMCNC: 32.3 G/DL (ref 32–36)
MCHC RBC AUTO-ENTMCNC: 32.3 G/DL (ref 32–36)
MCV RBC AUTO: 89.2 FL (ref 80–96)
MCV RBC AUTO: 90 FL (ref 80–96)
MCV RBC AUTO: 92.5 FL (ref 80–96)
MONOCYTES # BLD AUTO: 0.26 K/UL (ref 0–0.8)
MONOCYTES # BLD AUTO: 0.55 K/UL (ref 0–0.8)
MONOCYTES # BLD AUTO: 0.84 K/UL (ref 0–0.8)
MONOCYTES NFR BLD AUTO: 1.7 % (ref 2–6)
MONOCYTES NFR BLD AUTO: 4.2 % (ref 2–6)
MONOCYTES NFR BLD AUTO: 4.4 % (ref 2–6)
MONOCYTES NFR BLD MANUAL: 1 % (ref 2–6)
MONOCYTES NFR BLD MANUAL: 3 % (ref 2–6)
MONOCYTES NFR BLD MANUAL: 5 % (ref 2–6)
MPC BLD CALC-MCNC: 8.2 FL (ref 9.4–12.4)
MPC BLD CALC-MCNC: 8.6 FL (ref 9.4–12.4)
MPC BLD CALC-MCNC: 8.6 FL (ref 9.4–12.4)
NEUTROPHILS # BLD AUTO: 12.06 K/UL (ref 1.8–7.7)
NEUTROPHILS # BLD AUTO: 14.9 K/UL (ref 1.8–7.7)
NEUTROPHILS # BLD AUTO: 17.74 K/UL (ref 1.8–7.7)
NEUTROPHILS NFR BLD AUTO: 93.1 % (ref 53–65)
NEUTROPHILS NFR BLD AUTO: 93.6 % (ref 53–65)
NEUTROPHILS NFR BLD AUTO: 95 % (ref 53–65)
NEUTS BAND NFR BLD MANUAL: 16 % (ref 1–5)
NEUTS SEG NFR BLD MANUAL: 79 % (ref 50–62)
NEUTS SEG NFR BLD MANUAL: 93 % (ref 50–62)
NEUTS SEG NFR BLD MANUAL: 98 % (ref 50–62)
NRBC BLD MANUAL-RTO: ABNORMAL %
NT-PROBNP SERPL-MCNC: 442 PG/ML (ref 1–125)
PLATELET # BLD AUTO: 460 K/UL (ref 150–400)
PLATELET # BLD AUTO: 477 K/UL (ref 150–400)
PLATELET # BLD AUTO: 504 K/UL (ref 150–400)
POTASSIUM SERPL-SCNC: 3.8 MMOL/L (ref 3.5–5.1)
POTASSIUM SERPL-SCNC: 3.8 MMOL/L (ref 3.5–5.1)
POTASSIUM SERPL-SCNC: 4.8 MMOL/L (ref 3.5–5.1)
PROT SERPL-MCNC: 7 G/DL (ref 6.4–8.2)
RBC # BLD AUTO: 3.89 M/UL (ref 4.2–5.4)
RBC # BLD AUTO: 3.89 M/UL (ref 4.2–5.4)
RBC # BLD AUTO: 4.29 M/UL (ref 4.2–5.4)
SARS-COV+SARS-COV-2 AG RESP QL IA.RAPID: NEGATIVE
SODIUM SERPL-SCNC: 132 MMOL/L (ref 136–145)
SODIUM SERPL-SCNC: 135 MMOL/L (ref 136–145)
SODIUM SERPL-SCNC: 135 MMOL/L (ref 136–145)
THEOPHYLLINE BLD-MCNC: 6.7 ΜG/ML (ref 5–20)
TOBRAMYCIN TROUGH SERPL-MCNC: <0.3 ΜG/ML (ref 0–1.9)
TROPONIN I SERPL HS-MCNC: 10.9 PG/ML
VANCOMYCIN TROUGH SERPL-MCNC: 11.5 ΜG/ML (ref 10–20)
WBC # BLD AUTO: 12.96 K/UL (ref 4.5–11)
WBC # BLD AUTO: 15.66 K/UL (ref 4.5–11)
WBC # BLD AUTO: 18.96 K/UL (ref 4.5–11)

## 2022-01-01 PROCEDURE — 36415 COLL VENOUS BLD VENIPUNCTURE: CPT | Performed by: FAMILY MEDICINE

## 2022-01-01 PROCEDURE — 93005 ELECTROCARDIOGRAM TRACING: CPT

## 2022-01-01 PROCEDURE — 25500020 PHARM REV CODE 255: Performed by: FAMILY MEDICINE

## 2022-01-01 PROCEDURE — 80202 ASSAY OF VANCOMYCIN: CPT | Performed by: FAMILY MEDICINE

## 2022-01-01 PROCEDURE — 94761 N-INVAS EAR/PLS OXIMETRY MLT: CPT

## 2022-01-01 PROCEDURE — 25000242 PHARM REV CODE 250 ALT 637 W/ HCPCS: Performed by: FAMILY MEDICINE

## 2022-01-01 PROCEDURE — 83880 ASSAY OF NATRIURETIC PEPTIDE: CPT | Performed by: FAMILY MEDICINE

## 2022-01-01 PROCEDURE — 99284 EMERGENCY DEPT VISIT MOD MDM: CPT | Mod: ,,, | Performed by: FAMILY MEDICINE

## 2022-01-01 PROCEDURE — 99284 PR EMERGENCY DEPT VISIT,LEVEL IV: ICD-10-PCS | Mod: ,,, | Performed by: FAMILY MEDICINE

## 2022-01-01 PROCEDURE — 94640 AIRWAY INHALATION TREATMENT: CPT

## 2022-01-01 PROCEDURE — 25000242 PHARM REV CODE 250 ALT 637 W/ HCPCS: Performed by: REGISTERED NURSE

## 2022-01-01 PROCEDURE — 63600175 PHARM REV CODE 636 W HCPCS: Performed by: FAMILY MEDICINE

## 2022-01-01 PROCEDURE — S0073 INJECTION, AZTREONAM, 500 MG: HCPCS | Performed by: FAMILY MEDICINE

## 2022-01-01 PROCEDURE — 99223 1ST HOSP IP/OBS HIGH 75: CPT | Mod: ,,, | Performed by: FAMILY MEDICINE

## 2022-01-01 PROCEDURE — 25000003 PHARM REV CODE 250: Performed by: FAMILY MEDICINE

## 2022-01-01 PROCEDURE — 11000001 HC ACUTE MED/SURG PRIVATE ROOM

## 2022-01-01 PROCEDURE — 27000221 HC OXYGEN, UP TO 24 HOURS

## 2022-01-01 PROCEDURE — 87040 BLOOD CULTURE FOR BACTERIA: CPT | Performed by: FAMILY MEDICINE

## 2022-01-01 PROCEDURE — 93010 EKG 12-LEAD: ICD-10-PCS | Mod: ,,, | Performed by: INTERNAL MEDICINE

## 2022-01-01 PROCEDURE — 36592 COLLECT BLOOD FROM PICC: CPT | Performed by: FAMILY MEDICINE

## 2022-01-01 PROCEDURE — 99285 EMERGENCY DEPT VISIT HI MDM: CPT | Mod: 25

## 2022-01-01 PROCEDURE — 85025 COMPLETE CBC W/AUTO DIFF WBC: CPT | Performed by: FAMILY MEDICINE

## 2022-01-01 PROCEDURE — 25000003 PHARM REV CODE 250: Performed by: REGISTERED NURSE

## 2022-01-01 PROCEDURE — 97162 PT EVAL MOD COMPLEX 30 MIN: CPT

## 2022-01-01 PROCEDURE — 99239 HOSP IP/OBS DSCHRG MGMT >30: CPT | Mod: ,,, | Performed by: FAMILY MEDICINE

## 2022-01-01 PROCEDURE — S4991 NICOTINE PATCH NONLEGEND: HCPCS | Performed by: FAMILY MEDICINE

## 2022-01-01 PROCEDURE — 80048 BASIC METABOLIC PNL TOTAL CA: CPT | Performed by: FAMILY MEDICINE

## 2022-01-01 PROCEDURE — 96365 THER/PROPH/DIAG IV INF INIT: CPT

## 2022-01-01 PROCEDURE — 96375 TX/PRO/DX INJ NEW DRUG ADDON: CPT

## 2022-01-01 PROCEDURE — 87428 SARSCOV & INF VIR A&B AG IA: CPT | Performed by: FAMILY MEDICINE

## 2022-01-01 PROCEDURE — 84484 ASSAY OF TROPONIN QUANT: CPT | Performed by: FAMILY MEDICINE

## 2022-01-01 PROCEDURE — 80053 COMPREHEN METABOLIC PANEL: CPT | Performed by: FAMILY MEDICINE

## 2022-01-01 PROCEDURE — 99223 PR INITIAL HOSPITAL CARE,LEVL III: ICD-10-PCS | Mod: ,,, | Performed by: FAMILY MEDICINE

## 2022-01-01 PROCEDURE — 85378 FIBRIN DEGRADE SEMIQUANT: CPT | Performed by: FAMILY MEDICINE

## 2022-01-01 PROCEDURE — 80200 ASSAY OF TOBRAMYCIN: CPT | Performed by: FAMILY MEDICINE

## 2022-01-01 PROCEDURE — 80198 ASSAY OF THEOPHYLLINE: CPT | Performed by: FAMILY MEDICINE

## 2022-01-01 PROCEDURE — 93010 ELECTROCARDIOGRAM REPORT: CPT | Mod: ,,, | Performed by: INTERNAL MEDICINE

## 2022-01-01 PROCEDURE — 83605 ASSAY OF LACTIC ACID: CPT | Performed by: FAMILY MEDICINE

## 2022-01-01 PROCEDURE — 99239 PR HOSPITAL DISCHARGE DAY,>30 MIN: ICD-10-PCS | Mod: ,,, | Performed by: FAMILY MEDICINE

## 2022-01-01 RX ORDER — METHYLPREDNISOLONE SOD SUCC 125 MG
62.5 VIAL (EA) INJECTION EVERY 6 HOURS
Status: DISCONTINUED | OUTPATIENT
Start: 2022-01-01 | End: 2022-01-01 | Stop reason: HOSPADM

## 2022-01-01 RX ORDER — LORAZEPAM 0.5 MG/1
0.5 TABLET ORAL EVERY 6 HOURS PRN
Qty: 90 TABLET | Refills: 3 | Status: CANCELLED | OUTPATIENT
Start: 2022-01-01 | End: 2022-01-01

## 2022-01-01 RX ORDER — BUDESONIDE 0.5 MG/2ML
0.5 INHALANT ORAL EVERY 12 HOURS
Status: DISCONTINUED | OUTPATIENT
Start: 2022-01-01 | End: 2022-01-01

## 2022-01-01 RX ORDER — MONTELUKAST SODIUM 10 MG/1
10 TABLET ORAL NIGHTLY
Status: DISCONTINUED | OUTPATIENT
Start: 2022-01-01 | End: 2022-01-01 | Stop reason: HOSPADM

## 2022-01-01 RX ORDER — METHYLPREDNISOLONE SOD SUCC 125 MG
125 VIAL (EA) INJECTION
Status: COMPLETED | OUTPATIENT
Start: 2022-01-01 | End: 2022-01-01

## 2022-01-01 RX ORDER — BUDESONIDE 0.5 MG/2ML
0.5 INHALANT ORAL EVERY 12 HOURS
Status: DISCONTINUED | OUTPATIENT
Start: 2022-01-01 | End: 2022-01-01 | Stop reason: HOSPADM

## 2022-01-01 RX ORDER — METHYLPREDNISOLONE SOD SUCC 125 MG
125 VIAL (EA) INJECTION EVERY 6 HOURS
Status: DISCONTINUED | OUTPATIENT
Start: 2022-01-01 | End: 2022-01-01

## 2022-01-01 RX ORDER — THEOPHYLLINE 300 MG/1
300 TABLET, EXTENDED RELEASE ORAL 2 TIMES DAILY
Status: DISCONTINUED | OUTPATIENT
Start: 2022-01-01 | End: 2022-01-01 | Stop reason: HOSPADM

## 2022-01-01 RX ORDER — IPRATROPIUM BROMIDE 0.5 MG/2.5ML
0.5 SOLUTION RESPIRATORY (INHALATION) EVERY 6 HOURS
Status: DISCONTINUED | OUTPATIENT
Start: 2022-01-01 | End: 2022-01-01 | Stop reason: HOSPADM

## 2022-01-01 RX ORDER — ONDANSETRON 2 MG/ML
4 INJECTION INTRAMUSCULAR; INTRAVENOUS EVERY 8 HOURS PRN
Status: DISCONTINUED | OUTPATIENT
Start: 2022-01-01 | End: 2022-01-01 | Stop reason: HOSPADM

## 2022-01-01 RX ORDER — IPRATROPIUM BROMIDE AND ALBUTEROL SULFATE 2.5; .5 MG/3ML; MG/3ML
SOLUTION RESPIRATORY (INHALATION)
Qty: 360 ML | Refills: 3 | Status: SHIPPED | OUTPATIENT
Start: 2022-01-01

## 2022-01-01 RX ORDER — ONDANSETRON 2 MG/ML
4 INJECTION INTRAMUSCULAR; INTRAVENOUS ONCE
Status: DISCONTINUED | OUTPATIENT
Start: 2022-01-01 | End: 2022-01-01 | Stop reason: HOSPADM

## 2022-01-01 RX ORDER — BISACODYL 10 MG
10 SUPPOSITORY, RECTAL RECTAL DAILY PRN
Status: DISCONTINUED | OUTPATIENT
Start: 2022-01-01 | End: 2022-01-01 | Stop reason: HOSPADM

## 2022-01-01 RX ORDER — ACETAMINOPHEN 325 MG/1
650 TABLET ORAL EVERY 8 HOURS PRN
Status: DISCONTINUED | OUTPATIENT
Start: 2022-01-01 | End: 2022-01-01 | Stop reason: HOSPADM

## 2022-01-01 RX ORDER — ENOXAPARIN SODIUM 100 MG/ML
40 INJECTION SUBCUTANEOUS EVERY 24 HOURS
Status: DISCONTINUED | OUTPATIENT
Start: 2022-01-01 | End: 2022-01-01 | Stop reason: HOSPADM

## 2022-01-01 RX ORDER — POLYETHYLENE GLYCOL 3350 17 G/17G
17 POWDER, FOR SOLUTION ORAL DAILY
Status: DISCONTINUED | OUTPATIENT
Start: 2022-01-01 | End: 2022-01-01 | Stop reason: HOSPADM

## 2022-01-01 RX ORDER — SODIUM CHLORIDE 0.9 % (FLUSH) 0.9 %
10 SYRINGE (ML) INJECTION
Status: DISCONTINUED | OUTPATIENT
Start: 2022-01-01 | End: 2022-01-01 | Stop reason: HOSPADM

## 2022-01-01 RX ORDER — HYDROCODONE BITARTRATE AND ACETAMINOPHEN 5; 325 MG/1; MG/1
1 TABLET ORAL EVERY 4 HOURS PRN
Status: DISCONTINUED | OUTPATIENT
Start: 2022-01-01 | End: 2022-01-01 | Stop reason: HOSPADM

## 2022-01-01 RX ORDER — LEVALBUTEROL INHALATION SOLUTION 1.25 MG/3ML
1.25 SOLUTION RESPIRATORY (INHALATION)
Status: COMPLETED | OUTPATIENT
Start: 2022-01-01 | End: 2022-01-01

## 2022-01-01 RX ORDER — PANTOPRAZOLE SODIUM 40 MG/1
40 TABLET, DELAYED RELEASE ORAL DAILY
Status: DISCONTINUED | OUTPATIENT
Start: 2022-01-01 | End: 2022-01-01 | Stop reason: HOSPADM

## 2022-01-01 RX ORDER — DEXTROMETHORPHAN POLISTIREX 30 MG/5 ML
1 SUSPENSION, EXTENDED RELEASE 12 HR ORAL DAILY PRN
Status: DISCONTINUED | OUTPATIENT
Start: 2022-01-01 | End: 2022-01-01 | Stop reason: HOSPADM

## 2022-01-01 RX ORDER — SODIUM CHLORIDE 9 MG/ML
1000 INJECTION, SOLUTION INTRAVENOUS CONTINUOUS
Status: DISCONTINUED | OUTPATIENT
Start: 2022-01-01 | End: 2022-01-01 | Stop reason: HOSPADM

## 2022-01-01 RX ORDER — CALCIUM CARBONATE 200(500)MG
500 TABLET,CHEWABLE ORAL 2 TIMES DAILY PRN
Status: DISCONTINUED | OUTPATIENT
Start: 2022-01-01 | End: 2022-01-01 | Stop reason: HOSPADM

## 2022-01-01 RX ORDER — POLYETHYLENE GLYCOL 3350 17 G/17G
17 POWDER, FOR SOLUTION ORAL DAILY
Status: DISCONTINUED | OUTPATIENT
Start: 2022-01-01 | End: 2022-01-01

## 2022-01-01 RX ORDER — IBUPROFEN 200 MG
1 TABLET ORAL
Status: DISCONTINUED | OUTPATIENT
Start: 2022-01-01 | End: 2022-01-01 | Stop reason: HOSPADM

## 2022-01-01 RX ORDER — IPRATROPIUM BROMIDE AND ALBUTEROL SULFATE 2.5; .5 MG/3ML; MG/3ML
SOLUTION RESPIRATORY (INHALATION)
Qty: 360 ML | Refills: 3 | Status: SHIPPED | OUTPATIENT
Start: 2022-01-01 | End: 2022-01-01 | Stop reason: SDUPTHER

## 2022-01-01 RX ORDER — LORAZEPAM 2 MG/ML
1 INJECTION INTRAMUSCULAR EVERY 4 HOURS PRN
Status: DISCONTINUED | OUTPATIENT
Start: 2022-01-01 | End: 2022-01-01 | Stop reason: HOSPADM

## 2022-01-01 RX ORDER — LORAZEPAM 2 MG/ML
1 INJECTION INTRAMUSCULAR
Status: COMPLETED | OUTPATIENT
Start: 2022-01-01 | End: 2022-01-01

## 2022-01-01 RX ORDER — LEVALBUTEROL INHALATION SOLUTION 1.25 MG/3ML
1.25 SOLUTION RESPIRATORY (INHALATION) EVERY 6 HOURS
Status: DISCONTINUED | OUTPATIENT
Start: 2022-01-01 | End: 2022-01-01 | Stop reason: HOSPADM

## 2022-01-01 RX ORDER — LEVALBUTEROL INHALATION SOLUTION 1.25 MG/3ML
1.25 SOLUTION RESPIRATORY (INHALATION) EVERY 4 HOURS
Status: DISCONTINUED | OUTPATIENT
Start: 2022-01-01 | End: 2022-01-01

## 2022-01-01 RX ORDER — FLUTICASONE PROPIONATE AND SALMETEROL 500; 50 UG/1; UG/1
1 POWDER RESPIRATORY (INHALATION) 2 TIMES DAILY
Status: DISCONTINUED | OUTPATIENT
Start: 2022-01-01 | End: 2022-01-01

## 2022-01-01 RX ORDER — TALC
6 POWDER (GRAM) TOPICAL NIGHTLY PRN
Status: DISCONTINUED | OUTPATIENT
Start: 2022-01-01 | End: 2022-01-01 | Stop reason: HOSPADM

## 2022-01-01 RX ORDER — IPRATROPIUM BROMIDE AND ALBUTEROL SULFATE 2.5; .5 MG/3ML; MG/3ML
3 SOLUTION RESPIRATORY (INHALATION) ONCE
Status: COMPLETED | OUTPATIENT
Start: 2022-01-01 | End: 2022-01-01

## 2022-01-01 RX ADMIN — LORAZEPAM 1 MG: 2 INJECTION INTRAMUSCULAR; INTRAVENOUS at 06:04

## 2022-01-01 RX ADMIN — SODIUM CHLORIDE 1000 ML: 9 INJECTION, SOLUTION INTRAVENOUS at 07:04

## 2022-01-01 RX ADMIN — AZTREONAM 1000 MG: 1 INJECTION, POWDER, LYOPHILIZED, FOR SOLUTION INTRAMUSCULAR; INTRAVENOUS at 08:04

## 2022-01-01 RX ADMIN — LORAZEPAM 1 MG: 2 INJECTION INTRAMUSCULAR; INTRAVENOUS at 05:04

## 2022-01-01 RX ADMIN — VANCOMYCIN HYDROCHLORIDE 1000 MG: 1 INJECTION, POWDER, LYOPHILIZED, FOR SOLUTION INTRAVENOUS at 02:04

## 2022-01-01 RX ADMIN — LEVALBUTEROL 1.25 MG: 1.25 SOLUTION RESPIRATORY (INHALATION) at 07:04

## 2022-01-01 RX ADMIN — NICOTINE 1 PATCH: 21 PATCH, EXTENDED RELEASE TRANSDERMAL at 01:04

## 2022-01-01 RX ADMIN — IPRATROPIUM BROMIDE 0.5 MG: 0.5 SOLUTION RESPIRATORY (INHALATION) at 12:04

## 2022-01-01 RX ADMIN — LEVALBUTEROL 1.25 MG: 1.25 SOLUTION RESPIRATORY (INHALATION) at 01:04

## 2022-01-01 RX ADMIN — METHYLPREDNISOLONE SODIUM SUCCINATE 125 MG: 125 INJECTION, POWDER, FOR SOLUTION INTRAMUSCULAR; INTRAVENOUS at 05:04

## 2022-01-01 RX ADMIN — BUDESONIDE 0.5 MG: 0.5 INHALANT RESPIRATORY (INHALATION) at 08:04

## 2022-01-01 RX ADMIN — METHYLPREDNISOLONE SODIUM SUCCINATE 62.5 MG: 125 INJECTION, POWDER, FOR SOLUTION INTRAMUSCULAR; INTRAVENOUS at 12:04

## 2022-01-01 RX ADMIN — METHYLPREDNISOLONE SODIUM SUCCINATE 62.5 MG: 125 INJECTION, POWDER, FOR SOLUTION INTRAMUSCULAR; INTRAVENOUS at 05:04

## 2022-01-01 RX ADMIN — THEOPHYLLINE 300 MG: 300 TABLET, EXTENDED RELEASE ORAL at 08:04

## 2022-01-01 RX ADMIN — THEOPHYLLINE 300 MG: 300 TABLET, EXTENDED RELEASE ORAL at 01:04

## 2022-01-01 RX ADMIN — PANTOPRAZOLE SODIUM 40 MG: 40 TABLET, DELAYED RELEASE ORAL at 08:04

## 2022-01-01 RX ADMIN — TOBRAMYCIN 240 MG: 40 INJECTION INTRAMUSCULAR; INTRAVENOUS at 03:04

## 2022-01-01 RX ADMIN — AZTREONAM 1000 MG: 1 INJECTION, POWDER, LYOPHILIZED, FOR SOLUTION INTRAMUSCULAR; INTRAVENOUS at 12:04

## 2022-01-01 RX ADMIN — AZTREONAM 1000 MG: 1 INJECTION, POWDER, LYOPHILIZED, FOR SOLUTION INTRAMUSCULAR; INTRAVENOUS at 05:04

## 2022-01-01 RX ADMIN — SODIUM CHLORIDE 1000 ML: 9 INJECTION, SOLUTION INTRAVENOUS at 06:04

## 2022-01-01 RX ADMIN — BUDESONIDE 0.5 MG: 0.5 INHALANT RESPIRATORY (INHALATION) at 07:04

## 2022-01-01 RX ADMIN — IPRATROPIUM BROMIDE 0.5 MG: 0.5 SOLUTION RESPIRATORY (INHALATION) at 07:04

## 2022-01-01 RX ADMIN — ONDANSETRON 4 MG: 2 INJECTION INTRAMUSCULAR; INTRAVENOUS at 07:04

## 2022-01-01 RX ADMIN — IPRATROPIUM BROMIDE AND ALBUTEROL SULFATE 3 ML: .5; 3 SOLUTION RESPIRATORY (INHALATION) at 05:04

## 2022-01-01 RX ADMIN — MONTELUKAST SODIUM 10 MG: 10 TABLET, COATED ORAL at 08:04

## 2022-01-01 RX ADMIN — ANTACID TABLETS 500 MG: 500 TABLET, CHEWABLE ORAL at 06:04

## 2022-01-01 RX ADMIN — ACETAMINOPHEN 650 MG: 325 TABLET, FILM COATED ORAL at 02:04

## 2022-01-01 RX ADMIN — IPRATROPIUM BROMIDE 0.5 MG: 0.5 SOLUTION RESPIRATORY (INHALATION) at 01:04

## 2022-01-01 RX ADMIN — METHYLPREDNISOLONE SODIUM SUCCINATE 125 MG: 125 INJECTION, POWDER, FOR SOLUTION INTRAMUSCULAR; INTRAVENOUS at 06:04

## 2022-01-01 RX ADMIN — METHYLPREDNISOLONE SODIUM SUCCINATE 125 MG: 125 INJECTION, POWDER, FOR SOLUTION INTRAMUSCULAR; INTRAVENOUS at 12:04

## 2022-01-01 RX ADMIN — MELATONIN TAB 3 MG 6 MG: 3 TAB at 08:04

## 2022-01-01 RX ADMIN — SODIUM CHLORIDE 1000 ML: 9 INJECTION, SOLUTION INTRAVENOUS at 02:04

## 2022-01-01 RX ADMIN — SODIUM CHLORIDE 1000 ML: 9 INJECTION, SOLUTION INTRAVENOUS at 11:04

## 2022-01-01 RX ADMIN — NICOTINE 1 PATCH: 21 PATCH, EXTENDED RELEASE TRANSDERMAL at 12:04

## 2022-01-01 RX ADMIN — POLYETHYLENE GLYCOL 3350 17 G: 17 POWDER, FOR SOLUTION ORAL at 01:04

## 2022-01-01 RX ADMIN — IOPAMIDOL 100 ML: 755 INJECTION, SOLUTION INTRAVENOUS at 07:04

## 2022-01-01 RX ADMIN — AZTREONAM 1000 MG: 1 INJECTION, POWDER, LYOPHILIZED, FOR SOLUTION INTRAMUSCULAR; INTRAVENOUS at 04:04

## 2022-01-01 RX ADMIN — ONDANSETRON 4 MG: 2 INJECTION INTRAMUSCULAR; INTRAVENOUS at 02:04

## 2022-01-01 RX ADMIN — IPRATROPIUM BROMIDE 0.5 MG: 0.5 SOLUTION RESPIRATORY (INHALATION) at 06:04

## 2022-01-01 RX ADMIN — ENOXAPARIN SODIUM 40 MG: 40 INJECTION SUBCUTANEOUS at 04:04

## 2022-01-01 RX ADMIN — METHYLPREDNISOLONE SODIUM SUCCINATE 125 MG: 125 INJECTION, POWDER, FOR SOLUTION INTRAMUSCULAR; INTRAVENOUS at 01:04

## 2022-01-01 RX ADMIN — METHYLPREDNISOLONE SODIUM SUCCINATE 62.5 MG: 125 INJECTION, POWDER, FOR SOLUTION INTRAMUSCULAR; INTRAVENOUS at 11:04

## 2022-01-01 RX ADMIN — POLYETHYLENE GLYCOL 3350 17 G: 17 POWDER, FOR SOLUTION ORAL at 08:04

## 2022-01-01 RX ADMIN — TOBRAMYCIN 240 MG: 40 INJECTION INTRAMUSCULAR; INTRAVENOUS at 04:04

## 2022-01-01 RX ADMIN — ENOXAPARIN SODIUM 40 MG: 40 INJECTION SUBCUTANEOUS at 05:04

## 2022-04-04 NOTE — TELEPHONE ENCOUNTER
Unable to reach  with two phone call attempts:  Message forwarded to her son Humza and he was asked to have  call here for appointment reminder.     had a scheduled appt for January that included PFT and CT Chest : she had to cancel that appt.  Noted today that appt was rescheduled for April 25:  CT and PFT needed to be added and these have been rescheduled.  Call was made to remind her of appt times;    4/25/22:  2 PMPFT   Then CT Chest 2:30 PM  Then see MD after these procedures.     confirmed that pt's listed address is not upt to date: she lives in [place]: reminder can be mailed once address is confirmed.//gp

## 2022-04-04 NOTE — TELEPHONE ENCOUNTER
Reached , pt's caretaker, by phone   [phone]  and asked her to share appt time/date with Ms Quintanilla:  PFT 2:00, CT 2:30 Then Appt after CT  4/25/22   reported that she will see  today and will share appt information..//gp

## 2022-04-15 PROBLEM — J18.9 PNEUMONIA DUE TO INFECTIOUS ORGANISM: Status: ACTIVE | Noted: 2022-01-01

## 2022-04-15 PROBLEM — J96.01 SEPSIS WITH ACUTE HYPOXIC RESPIRATORY FAILURE WITHOUT SEPTIC SHOCK: Status: ACTIVE | Noted: 2022-01-01

## 2022-04-15 PROBLEM — R65.20 SEPSIS WITH ACUTE HYPOXIC RESPIRATORY FAILURE WITHOUT SEPTIC SHOCK: Status: ACTIVE | Noted: 2022-01-01

## 2022-04-15 PROBLEM — A41.9 SEPSIS WITH ACUTE HYPOXIC RESPIRATORY FAILURE WITHOUT SEPTIC SHOCK: Status: ACTIVE | Noted: 2022-01-01

## 2022-04-15 PROBLEM — J96.01 ACUTE HYPOXEMIC RESPIRATORY FAILURE: Status: ACTIVE | Noted: 2022-01-01

## 2022-04-15 NOTE — ED PROVIDER NOTES
"Encounter Date: 4/15/2022       History     Chief Complaint   Patient presents with    Anxiety     States has been home on O2 but oxygen tank " ran out" yesterday around 3pm; has continued to feel anxious/nervous all night without use of oxygen; felt anxious and short of breath and attempted use of breathing treatments but continued to feel anxious and increasingly SOB so family called EMS     Patient presents to the ER with the chief complaint of shortness of breath and anxiety.  Patient has chronic COPD for which she takes the offline and is on home oxygen.  Patient was discharged from [facility] earlier this week after being treated for pneumonia.  She was discharged home on 6 L of oxygen and with a prescription for Levaquin.  Earlier today her oxygen concentrator went out.  She became extremely short of breath and switched over to using an oxygen tank which was almost nearly out.  She took several breathing treatments and two doses of Vistaril.  In spite of that she continued to have difficulty breathing and respiratory distress.  She called EMS who then prior to the ER.  On presentation she is tachycardic and tachypneic.  This improved with Ativan, Solu-Medrol, and a breathing treatment.  No other issues reported.        Review of patient's allergies indicates:   Allergen Reactions    Magnesium sulfate     Pcn [penicillins]     Wellbutrin [bupropion hcl]      Past Medical History:   Diagnosis Date    Arthritis     Asthma     COPD (chronic obstructive pulmonary disease)     Respiratory Failure, Hypoxia, SOB, home O2    GERD (gastroesophageal reflux disease)     Hypertension     Kidney stone     Pulmonary Mycobacterium avium complex (MAC) infection     4/2021    Tachycardia     Tobacco dependence      Past Surgical History:   Procedure Laterality Date    LITHOTRIPSY       Family History   Problem Relation Age of Onset    Diabetes Mother     Cancer Father     Hyperlipidemia Father     " Diabetes Maternal Grandfather     Cancer Paternal Grandfather      Social History     Tobacco Use    Smoking status: Current Every Day Smoker     Packs/day: 0.50     Years: 35.00     Pack years: 17.50     Types: Cigarettes    Smokeless tobacco: Never Used   Substance Use Topics    Alcohol use: Not Currently     Comment: socially    Drug use: Never     Review of Systems   Constitutional: Positive for fatigue.   HENT: Negative for congestion.    Eyes: Negative for pain.   Respiratory: Positive for chest tightness and shortness of breath.    Cardiovascular: Negative for chest pain.   Gastrointestinal: Negative for abdominal pain.   Genitourinary: Negative for dysuria.   Skin: Negative for rash.   Neurological: Positive for weakness.   Psychiatric/Behavioral: Positive for agitation.       Physical Exam     Initial Vitals [04/15/22 0617]   BP Pulse Resp Temp SpO2   122/78 (!) 144 (!) 28 99 °F (37.2 °C) (!) 94 %      MAP       --         Physical Exam    Constitutional: She appears well-developed. She is not diaphoretic. She appears distressed.   HENT:   Head: Normocephalic and atraumatic.   Eyes: Conjunctivae are normal. Right eye exhibits no discharge. Left eye exhibits no discharge. No scleral icterus.   Cardiovascular: Exam reveals no gallop and no friction rub.    No murmur heard.  Tachycardic   Pulmonary/Chest: She is in respiratory distress. She has wheezes. She has rhonchi. She has no rales. She exhibits no tenderness.   Abdominal: Abdomen is soft. She exhibits no distension and no mass. There is no abdominal tenderness. There is no rebound and no guarding.     Skin: Skin is warm and dry. No rash and no abscess noted. No erythema. No pallor.   Psychiatric:   Anxious appearing         Medical Screening Exam   See Full Note    ED Course   Procedures  Labs Reviewed   COMPREHENSIVE METABOLIC PANEL - Abnormal; Notable for the following components:       Result Value    Sodium 132 (*)     Chloride 87 (*)     CO2 42  (*)     Glucose 112 (*)     BUN 19 (*)     Creatinine 0.50 (*)     BUN/Creatinine Ratio 38 (*)     Albumin 2.3 (*)     Globulin 4.7 (*)     Alk Phos 183 (*)     All other components within normal limits   LACTIC ACID, PLASMA - Abnormal; Notable for the following components:    Lactic Acid 2.2 (*)     All other components within normal limits   D DIMER, QUANTITATIVE - Abnormal; Notable for the following components:    D-Dimer 6.16 (*)     All other components within normal limits   NT-PRO NATRIURETIC PEPTIDE - Abnormal; Notable for the following components:    ProBNP 442 (*)     All other components within normal limits   CBC WITH DIFFERENTIAL - Abnormal; Notable for the following components:    WBC 18.96 (*)     MCHC 31.0 (*)     RDW 15.4 (*)     Platelet Count 460 (*)     MPV 8.6 (*)     Neutrophils % 93.6 (*)     Lymphocytes % 1.8 (*)     Neutrophils, Abs 17.74 (*)     Lymphocytes, Absolute 0.35 (*)     Eosinophils % 0.1 (*)     Monocytes, Absolute 0.84 (*)     All other components within normal limits   MANUAL DIFFERENTIAL - Abnormal; Notable for the following components:    Segmented Neutrophils, Man % 79 (*)     Bands, Man % 16 (*)     Lymphocytes, Man % 2 (*)     All other components within normal limits   TROPONIN I - Normal   SARS-COV2 (COVID) W/ FLU ANTIGEN - Normal    Narrative:     Negative SARS-CoV results should not be used as the sole basis for treatment or patient management decisions; negative results should be considered in the context of a patient's recent exposures, history and the presene of clinical signs and symptoms consistent with COVID-19.  Negative results should be treated as presumptive and confirmed by molecular assay, if necessary for patient management.   CULTURE, BLOOD   CULTURE, BLOOD   CBC W/ AUTO DIFFERENTIAL    Narrative:     The following orders were created for panel order CBC auto differential.  Procedure                               Abnormality         Status                      ---------                               -----------         ------                     CBC with Differential[[id number]]        Abnormal            Final result               Manual Differential[[id number]]          Abnormal            Final result                 Please view results for these tests on the individual orders.   LACTIC ACID, PLASMA     EKG Readings: (Independently Interpreted)   Sinus tachycardia, rate is 137, normal axis, normal intervals, no obvious signs ischemia, no significant ST segment changes.     ECG Results          EKG 12-lead (In process)  Result time 04/15/22 06:51:54    In process by Interface, Lab In [facility] (04/15/22 06:51:54)                 Narrative:    Test Reason : I49.9,    Vent. Rate : 137 BPM     Atrial Rate : 137 BPM     P-R Int : 130 ms          QRS Dur : 062 ms      QT Int : 282 ms       P-R-T Axes : 094 040 050 degrees     QTc Int : 425 ms    Sinus tachycardia  Possible Anterior infarct ,age undetermined  Abnormal ECG  No previous ECGs available    Referred By: [email or web address]   SELF           Confirmed By:                             Imaging Results          CTA Chest Non-Coronary (PE Study) (Final result)  Result time 04/15/22 08:01:30    Final result by Jose M uKrtz II, MD (04/15/22 08:01:30)                 Impression:      No evidence of pulmonary thromboembolism.  Airspace density mostly left lung could indicate pneumonia.  There is cavitation present some of which is chronic but cavitating pneumonia and or malignancy cannot be excluded..      Electronically signed by: Jose M Kurtz  Date:    04/15/2022  Time:    08:01             Narrative:    EXAMINATION:  CTA CHEST NON CORONARY    CLINICAL HISTORY:  Pulmonary embolism (PE) suspected, high prob;    TECHNIQUE:  Axial CT imaging of the chest is performed with intravenous contrast. Contrast dose is 100 cc Isovue 370.    CT dose reduction technique used - Dose Rite and tube current modulation.    COMPARISON:  None  available    FINDINGS:  No thrombus or other abnormality is identified in the pulmonary arteries or veins.  The pulmonary vessel caliber is within normal limits.  The heart, mediastinum and great vessels appear within normal limits.    Chronic lung changes present with cavitation and bulla present.  There is airspace density within the left lung both upper lobes and lower lobes.  Remaining pulmonary parenchyma shows no evidence of airspace disease or abnormal density.  No effusion or pneumothorax is present.                               X-Ray Chest 1 View (Final result)  Result time 04/15/22 08:35:41    Final result by Maksim Martins DO (04/15/22 08:35:41)                 Impression:      As above.      Electronically signed by: Maksim Martins  Date:    04/15/2022  Time:    08:35             Narrative:    EXAMINATION:  XR CHEST 1 VIEW    CLINICAL HISTORY:  Chronic obstructive pulmonary disease with (acute) exacerbation    TECHNIQUE:  XR CHEST 1 VIEW    COMPARISON:  Comparisons were reviewed, if available.    FINDINGS:  No lines or tubes.    Increased consolidation left upper lobe, developing pneumonia is a concern    Pleura is similar to comparison    Cardiac silhouette is similar to comparison exam.    No new acute bone findings.                              X-Rays:   Independently Interpreted Readings:   Other Readings:  Imaging Results          CTA Chest Non-Coronary (PE Study) (Final result)  Result time 04/15/22   08:01:30    Final result by Jose M Kurtz II, MD (04/15/22 08:01:30)                 Impression:      No evidence of pulmonary thromboembolism.  Airspace density mostly left   lung could indicate pneumonia.  There is cavitation present some of which   is chronic but cavitating pneumonia and or malignancy cannot be excluded..      Electronically signed by: Jose M Kurtz  Date:    04/15/2022  Time:    08:01             Narrative:    EXAMINATION:  CTA CHEST NON CORONARY    CLINICAL  HISTORY:  Pulmonary embolism (PE) suspected, high prob;    TECHNIQUE:  Axial CT imaging of the chest is performed with intravenous contrast.   Contrast dose is 100 cc Isovue 370.    CT dose reduction technique used - Dose Rite and tube current modulation.    COMPARISON:  None available    FINDINGS:  No thrombus or other abnormality is identified in the pulmonary arteries   or veins.  The pulmonary vessel caliber is within normal limits.  The   heart, mediastinum and great vessels appear within normal limits.    Chronic lung changes present with cavitation and bulla present.  There is   airspace density within the left lung both upper lobes and lower lobes.    Remaining pulmonary parenchyma shows no evidence of airspace disease or   abnormal density.  No effusion or pneumothorax is present.                               X-Ray Chest 1 View (Final result)  Result time 04/15/22 08:35:41    Final result by Maksim Martins DO (04/15/22 08:35:41)                 Impression:      As above.      Electronically signed by: Maksim Martins  Date:    04/15/2022  Time:    08:35             Narrative:    EXAMINATION:  XR CHEST 1 VIEW    CLINICAL HISTORY:  Chronic obstructive pulmonary disease with (acute) exacerbation    TECHNIQUE:  XR CHEST 1 VIEW    COMPARISON:  Comparisons were reviewed, if available.    FINDINGS:  No lines or tubes.    Increased consolidation left upper lobe, developing pneumonia is a concern    Pleura is similar to comparison    Cardiac silhouette is similar to comparison exam.    No new acute bone findings.                                Medications   sodium chloride 0.9% bolus 1,000 mL (has no administration in time range)   aztreonam (AZACTAM) 1,000 mg in dextrose 5 % in water (D5W) 5 % 50 mL IVPB (MB+) (1,000 mg Intravenous New Bag 4/15/22 0850)   lorazepam injection 1 mg (1 mg Intravenous Given 4/15/22 0658)   methylPREDNISolone sodium succinate injection 125 mg (125 mg Intravenous Given 4/15/22  0658)   sodium chloride 0.9% bolus 1,000 mL (1,000 mLs Intravenous New Bag 4/15/22 0658)   levalbuterol nebulizer solution 1.25 mg (1.25 mg Nebulization Given 4/15/22 0715)   iopamidoL (ISOVUE-370) injection 100 mL (100 mLs Intravenous Given 4/15/22 0737)     Medical Decision Making:   Clinical Tests:   Lab Tests: Ordered and Reviewed  The following lab test(s) were unremarkable: Troponin and BNP       <> Summary of Lab: Lab work consistent with sepsis and lactic acidosis.  Radiological Study: Ordered and Reviewed  Medical Tests: Ordered and Reviewed  ED Management:  Patient has severe sepsis with respiratory failure.  CT angiogram of the chest was negative for pulmonary embolus but did show a possible cavitary pneumonia versus chronic structural changes secondary to severe COPD.  Patient has failed outpatient antibiotic treatment with Levaquin.  I believe she needs to be admitted for IV antibiotic therapy and for evaluation by pulmonology.  I discussed this plan with the patient.  All questions were answered all issues were addressed.  The patient verbalized understanding of and agreement with the plan.  Patient admitted in stable condition.                   Clinical Impression:   Final diagnoses:  [J44.1] COPD exacerbation  [I49.9] Arrhythmia  [J18.9] Pneumonia of left lung due to infectious organism, unspecified part of lung (Primary)  [A41.9, R65.20, J96.01] Sepsis with acute hypoxic respiratory failure without septic shock, due to unspecified organism          ED Disposition Condition    Admit               Negrito Menendez, DO  04/15/22 0925

## 2022-04-15 NOTE — PLAN OF CARE
Problem: Physical Therapy  Goal: Physical Therapy Goal  Description: Goals to be met by: Discharge     Patient will increase functional independence with mobility by performin. Sit to stand transfer with Modified [place]  2. Bed to chair transfer with Modified [place] using No Assistive Device  3. Gait  x 200 feet with [place] using No Assistive Device.   4. Increased functional strength to 4/5 for BLE demonstrating 7 stands in 30 seconds unassisted.    Outcome: Ongoing, Progressing

## 2022-04-15 NOTE — CARE UPDATE
Xopenex 1.25 given. Medication barcode would not scan for RT. RT could not pull med eihter, pulled by Pharmacy

## 2022-04-15 NOTE — H&P
"[facility]  [facility] Medicine  History & Physical    Patient Name: Yun Quintanilla  MRN: [id number]  Patient Class: IP- Inpatient  Admission Date: 4/15/2022  Attending Physician: Negrito Menendez DO   Primary Care Provider: Imani Renae MD         Patient information was obtained from patient and ER records.     Subjective:     Principal Problem:<principal problem not specified>    Chief Complaint:   Chief Complaint   Patient presents with    Anxiety     States has been home on O2 but oxygen tank " ran out" yesterday around 3pm; has continued to feel anxious/nervous all night without use of oxygen; felt anxious and short of breath and attempted use of breathing treatments but continued to feel anxious and increasingly SOB so family called EMS        HPI: Patient is a 51-year-old white female with a past medical history of severe COPD requiring home oxygen.  Patient was discharged from [facility] in the last week after being given inpatient treatment for COPD exacerbation and pneumonia.  After discharge home the patient's oxygen compressor malfunctioned.  Patient went into respiratory distress and called EMS who brought her to our facility.  On presentation patient is tachycardic, tachypneic, and hypoxic.  Oxygen saturation eugenia above 90% on 6 L by simple mask.  Tachypnea improved with Solu-Medrol and Xopenex.  Tachycardia improved with Ativan.  CT angiogram of the chest revealed no evidence of pulmonary embolus but did show a consolidation, and structural lung changes that could either be representative of a bullous emphysema or cavitary pneumonia.  Patient's lactic acid was elevated.  In the ER patient was given 1 L of IV fluid and Azactam 1g. Patient admitted for inpatient management of severe sepsis with acute respiratory failure, pneumonia, and COPD exacerbation.      Past Medical History:   Diagnosis Date    Arthritis     Asthma     COPD (chronic obstructive pulmonary disease)     " Marcello was seen today for cough, nose problem, uri and headache.    Diagnoses and all orders for this visit:    COVID-19 virus infection    Upper respiratory tract infection, unspecified type  -     POCT SARS-COV-2 ANTIGEN         Plan:  COVID test is positive.  Patient to self isolate/quarantine, follow CDC  guideline.  Patient to contact primary care physician for further advice and De isolation protocol.   Rest at home.     Follow CDC guidelines for discontinuing self quarantine.    Drink plenty of fluids.    Go to ER if worse.    Follow up closely with primary care.     If symptoms gets worse patient need to contact primary care physician for further management and advise.  Or go to the emergency room  Recommend plenty of fluids, hydration orally. . Rest. Avoid undue exertion. Watch for any increasing/worsening symptoms.   If the symptoms continues or  worsen contact primary care physician or  recommend patient go to the emergency room.     Dr. Vince Moreno M.D.  [facility]  [address] [address] [place] [address]  Telephone:  [phone]     CDC GUIDELINE:  Recent CDC guidelines are:  If you test positive for Covid-19, CDC criteria for discontinuing quarantine are:     self quarantine for 5 days with constant monitoring of symptoms and if symptom much improved or the symptoms have resolved , patient can discontinue  quarantine and  leave home and return to routine ,with safe distance and masking for another 5 more days.    -And has no fever for last 24 hours without using fever reducing medicine           Patient Education       Coronavirus Disease 2019 (COVID-19): Caring for Yourself or Others  If you or a household member have symptoms of COVID-19, follow these guidelines for preventing spread of the virus and managing symptoms. This is regardless of your vaccination status.  If you have COVID-19 symptoms  · Stay home. Call your healthcare provider and tell them you have symptoms.. Do this  Respiratory Failure, Hypoxia, SOB, home O2    GERD (gastroesophageal reflux disease)     Hypertension     Kidney stone     Pulmonary Mycobacterium avium complex (MAC) infection     4/2021    Tachycardia     Tobacco dependence        Past Surgical History:   Procedure Laterality Date    LITHOTRIPSY         Review of patient's allergies indicates:   Allergen Reactions    Magnesium sulfate     Pcn [penicillins]     Wellbutrin [bupropion hcl]        No current facility-administered medications on file prior to encounter.     Current Outpatient Medications on File Prior to Encounter   Medication Sig    albuterol sulfate 90 mcg/actuation aebs Inhale 180 mcg into the lungs as needed.    albuterol-ipratropium (DUO-NEB) 2.5 mg-0.5 mg/3 mL nebulizer solution USE 1 SOLUTION IN NEBULIZER 4 TIMES DAILY    ethambutoL (MYAMBUTOL) 400 MG Tab Take 800 mg by mouth once daily. 400 mg tab, 2 tab daily    fluticasone propionate (FLONASE) 50 mcg/actuation nasal spray 2 sprays by Each Nostril route as needed for Rhinitis.    fluticasone-salmeterol diskus inhaler 500-50 mcg Inhale 1 puff into the lungs 2 (two) times daily. Controller    furosemide (LASIX) 20 MG tablet Take 1 tablet (20 mg total) by mouth daily as needed.    hydrOXYzine pamoate (VISTARIL) 50 MG Cap Take 1 capsule (50 mg total) by mouth every 8 (eight) hours as needed.    ibuprofen 200 mg Cap Take 2 tablets by mouth as needed.    montelukast (SINGULAIR) 10 mg tablet Take 1 tablet (10 mg total) by mouth every evening.    nicotine (NICODERM CQ) 21 mg/24 hr Place 1 patch onto the skin every 24 hours.    oxymetazoline (AFRIN) 0.05 % nasal spray 2 sprays by Nasal route as needed for Congestion.    potassium 99 mg Tab Take 1 tablet by mouth once daily.    theophylline (THEODUR) 300 MG 12 hr tablet Take 1 tablet (300 mg total) by mouth 2 (two) times daily.    tiotropium (SPIRIVA) 18 mcg inhalation capsule Inhale 18 mcg into the lungs once daily. Controller  before going to any hospital or clinic. Follow your provider's instructions. You may be advised to isolate yourself at home. This is called self-isolation. You may also be told to stay at least 6 feet from others to prevent the spread of COVID-19. This is called social distancing.  · Stay away from work, school, and public places. Limit physical contact with family members. Limit visitors. Don't kiss anyone or share eating or drinking utensils. Clean surfaces you touch with disinfectant. This is to help prevent the virus from spreading.  · If you need to cough or sneeze, do it into a tissue. Then throw the tissue into the trash. If you don't have tissues, cough or sneeze into the bend of your elbow.  · Wear a cloth face mask with 2 or more layers of washable, breathable fabric and a nose wire while in public or when indoors with people who don't live with you. Or you can wear a disposable paper mask with a cloth mask on top. Wear the mask so that it covers both your nose and mouth.  · Don’t share food or personal items with people in your household. This includes items like eating and drinking utensils, towels, and bedding.  · If you need to go to a hospital or clinic, expect that the healthcare staff will wear protective equipment such as masks, gowns, gloves, and eye protection. You may be advised to wait in or enter through a separate area. This is to prevent the possible virus from spreading.  · Tell the healthcare staff about recent travel. This includes local travel on public transport. Staff may need to find other people you have been in contact with.  · Follow all instructions the healthcare staff give you.    If you have been diagnosed with COVID-19  · Stay home and start self-isolation. Don’t leave your home unless you need to get medical care. Don't go to work, school, or public areas. Don't use public transportation or taxis.  · Follow all instructions from your healthcare provider. Call your healthcare      Family History       Problem Relation (Age of Onset)    Cancer Father, Paternal Grandfather    Diabetes Mother, Maternal Grandfather    Hyperlipidemia Father          Tobacco Use    Smoking status: Current Every Day Smoker     Packs/day: 0.50     Years: 35.00     Pack years: 17.50     Types: Cigarettes    Smokeless tobacco: Never Used   Substance and Sexual Activity    Alcohol use: Not Currently     Comment: socially    Drug use: Never    Sexual activity: Not on file     Review of Systems   Constitutional:  Positive for activity change and fatigue.   HENT:  Negative for congestion.    Eyes:  Negative for pain.   Respiratory:  Positive for cough, chest tightness, shortness of breath and wheezing.    Cardiovascular:  Negative for chest pain.   Gastrointestinal:  Negative for abdominal pain.   Genitourinary:  Negative for dysuria.   Skin:  Negative for rash.   Neurological:  Positive for weakness.   Psychiatric/Behavioral:  The patient is nervous/anxious.    Objective:     Vital Signs (Most Recent):  Temp:  (BACK FROM CT) (04/15/22 0745)  Pulse: (!) 117 (04/15/22 0900)  Resp: 16 (04/15/22 0900)  BP: (!) 107/59 (04/15/22 0900)  SpO2: 96 % (04/15/22 0900)   Vital Signs (24h Range):  Temp:  [99 °F (37.2 °C)] 99 °F (37.2 °C)  Pulse:  [117-145] 117  Resp:  [16-30] 16  SpO2:  [94 %-97 %] 96 %  BP: (105-123)/(59-78) 107/59     Weight: 43.5 kg (96 lb)  Body mass index is 16.48 kg/m².    Physical Exam  Constitutional:       General: She is in acute distress.      Appearance: She is ill-appearing. She is not toxic-appearing or diaphoretic.   HENT:      Head: Normocephalic and atraumatic.   Eyes:      General: No scleral icterus.        Right eye: No discharge.         Left eye: No discharge.      Conjunctiva/sclera: Conjunctivae normal.   Cardiovascular:      Rate and Rhythm: Tachycardia present.      Heart sounds: Normal heart sounds. No murmur heard.    No friction rub. No gallop.   Pulmonary:      Effort: Respiratory  provider’s office before going. They can prepare and give you instructions. This will help prevent the virus from spreading.  · If you need to go to a hospital or clinic, expect that the healthcare staff will wear protective equipment such as masks, gowns, gloves, and eye protection. You may be advised to wait in or enter through a separate area. This is to prevent the possible virus from spreading.  · Wear a face mask with 2 or more layers and a nose wire. Use either a cloth mask with layers of tightly woven, breathable fabric or a disposable paper mask with a cloth mask on top. This is to protect other people from your germs. If you are not able to wear a mask, your caregivers should. Wear the mask so that it covers both your nose and mouth.  · Stay away from other people in your home.  · Stay away from pets and other animals.  · Don’t share food or personal items with people in your household. This includes items like eating and drinking utensils, towels, and bedding.  · If you need to cough or sneeze, do it into a tissue. Then throw the tissue into the trash. If you don't have tissues, cough or sneeze into the bend of your elbow.  · Wash your hands often.    Self-care at home   Prevention  Several vaccines are available to prevent COVID-19 or reduce its severity. These vaccine reduce how severe the illness will be if you get the virus. No vaccine is ever 100% effective in preventing any illness, but the COVID-19 vaccines work well and are safe. One vaccine is available for people as young as 5. Expert groups, including ACOG and CDC, advise pregnant or breastfeeding people to be vaccinated. Talk with your healthcare provider about which vaccine is best for you and your family.  Treatment  Current treatment is mainly aimed at helping your body while it fights the virus. This is known as supportive care. For serious COVID-19, you may need to stay in the hospital. Supportive care includes:  · Getting rest. This  distress present.      Breath sounds: No stridor. Wheezing and rhonchi present. No rales.   Chest:      Chest wall: No tenderness.   Abdominal:      General: Abdomen is flat. Bowel sounds are normal. There is no distension.      Palpations: Abdomen is soft. There is no mass.      Tenderness: There is no abdominal tenderness. There is no guarding or rebound.      Hernia: No hernia is present.   Skin:     General: Skin is warm and dry.      Coloration: Skin is not jaundiced or pale.      Findings: No bruising, erythema, lesion or rash.   Neurological:      Mental Status: She is alert.   Psychiatric:      Comments: Anxious appearing           Significant Labs: All pertinent labs within the past 24 hours have been reviewed.  CBC:   Recent Labs   Lab 04/15/22  0647   WBC 18.96*   HGB 12.3   HCT 39.7   *     CMP:   Recent Labs   Lab 04/15/22  0647   *   K 4.8   CL 87*   CO2 42*   *   BUN 19*   CREATININE 0.50*   CALCIUM 8.9   PROT 7.0   ALBUMIN 2.3*   BILITOT 0.2   ALKPHOS 183*   AST 18   ALT 21   ANIONGAP 8   EGFRNONAA 138       Significant Imaging: I have reviewed all pertinent imaging results/findings within the past 24 hours.    Assessment/Plan:     Acute hypoxemic respiratory failure  Patient has severe COPD.  She is currently on theophyllin and a number of inhalers.  I believe that this pneumonia has triggered a COPD exacerbation.  I will check a theophyllin level.  I started her on Xopenex q.4 scheduled, and Solu-Medrol 125 mg IV q.6.  I will begin to titrate her steroids down in the coming days.  I anticipate that her respiratory status will improve as her pneumonia is treated.  I have also consulted Dr. Lozano for inpatient evaluation.    Pneumonia due to infectious organism  Patient has pneumonia.  She is at high risk for pseudomonal infection given underlying structural lung changes.  Her lung changes are likely reflective of bullous emphysema that could be due to cavitary pneumonia.   helps your body fight the illness.  · Staying hydrated.  Drinking liquids is the best way to prevent dehydration. Try to drink 6 to 8 glasses of liquids every day, or as advised by your provider. Also check with your provider about which fluids are best for you. Don't drink fluids that contain caffeine or alcohol.  · Taking over-the-counter (OTC) pain medicine. These are used to help ease pain and reduce fever. Follow your healthcare provider's instructions for which OTC medicine to use.  If you've been treated for suspected or confirmed COVID-19 , follow all of your healthcare team's instructions. This will include when it's OK to stop self-isolation. You may also get instructions on position changes to help your breathing, such as lying on your belly (prone positioning). If you were treated at a hospital and discharged, you may be sent home with a pulse oximeter. This is a small electronic device that you clip on your fingertip. It measures the amount of oxygen in your body. Follow your healthcare team's instructions on its use, how they will be in touch with you, and let you know when to call them.  The FDA approved monoclonal antibody therapy for emergency investigational use in certain people who have a positive COVID-19 viral test and have mild to moderate symptoms but are not in the hospital. Your healthcare provider will advise you on whether monoclonal antibody therapy is appropriate for you. It's not approved to prevent COVID-19. It's approved for people 12 years and older who weigh at least 88 pounds (about 40 kgs) and are at high risk for severe COVID-19 and a hospital stay. This includes people who are 65 years and older and people with certain chronic conditions. Monoclonal antibody therapy is not approved for people who:  · Are in the hospital with COVID-19, or  · Need oxygen therapy for COVID-19, or  · Need oxygen therapy for a chronic condition and need to have oxygen flow increased because of  COVID-19     If you've had confirmed COVID-19, your healthcare team may ask you to consider donating your plasma. This is called COVID-19 convalescent plasma donation. Plasma from people fully recovered from COVID-19 may contain antibodies to help fight COVID-19 in people who are currently seriously ill with the disease. Experts don't know the safety of COVID-19 convalescent plasma or how well it works. Research continues. The FDA has approved it for emergency use in certain people with serious or life-threatening COVID-19.  Home care for a sick person   · Follow all instructions from healthcare staff.  · Wash your hands often.  · Wear protective clothing as advised.  · Make sure the sick person wears a mask. If they can't wear a mask, don't stay in the same room with the person. If you must be in the same room, wear a face mask. When wearing a mask, make sure that it covers both the nose and mouth.  · Keep track of the sick person’s symptoms.  · Clean home surfaces often with disinfectant. This includes phones, kitchen counters, fridge door handle, bathroom surfaces, and others.  · Don’t let anyone share household items with the sick person. This includes eating and drinking tools, towels, sheets, or blankets.  · Clean fabrics and laundry thoroughly.  · Keep other people and pets away from the sick person.    When you can stop self-isolation  When you are sick with COVID-19, you should stay away from other people. This is called self-isolation.  For normally healthy children or adults with COVID-19 symptoms, CDC advises stopping self-isolation when all 3 of these are true:  1. You have had no fever for at least 24 hours. This means no fever without medicine that reduces fever, such as acetaminophen, for at least 24 hours.  2. Your symptoms such as cough or trouble breathing have improved.  3. It has been at least 10 days since your first symptoms started.  For people who have COVID-19 but no symptoms , isolation  Patient is on day 1 of 7 of Azactam, tobramycin, and vancomycin.      Severe sepsis  Patient has severe sepsis with acute hypoxic respiratory failure.  This is secondary to pneumonia.  Patient failed out patient therapy with Levaquin.  She was also hospitalized this last week.  I placed her on Azactam, vancomycin, and tobramycin and collected blood cultures.  She is on day 1 of 7 of IV antibiotic therapy. She received 1 L of IV fluids in the ER.  I will continue her on IV fluids running at 75 cc/hour for 2 days.  Patient was hypotensive on presentation with this as since resolved.  She has good p.o. intake.        VTE Risk Mitigation (From admission, onward)    None             Negrito GONZALEZ Menendez, DO  Department of Hospital Medicine   [facility]   can stop 10 days after the first positive test.  If you have a weak immune system and COVID-19, or if you've had severe COVID-19,  your instructions on when to stop isolation will be somewhat different. Some conditions and treatments can cause a weak immune system. These include cancer treatment, bone marrow or organ transplants, and conditions such as HIV or other immune system disorders. You may be advised to self-isolate up to 20 days after your symptoms first started. Your healthcare provider may want to retest you for COVID-19. Follow your provider's instructions.  CDC mask guidance  Consider the CDC's guidance and your local community's instructions on face masks.     · Cloth masks may help prevent people who have COVID-19 from spreading the virus to others.  · Cloth masks are most likely to reduce COVID-19 spread when masks are widely used by people who are out in the public.  · Wear a mask inside your house if you live with someone who has symptoms of COVID-19 or has tested positive for COVID-19.  · CDC advises all people older than 2 who are not fully vaccinated to wear a mask and stay 6 feet away from others while in public.  · CDC advises people with a weak immune system, even if fully vaccinated, to continue wearing masks and to stay 6 feet away from others while in public.  · In  through grade 12 classes, CDC advises indoor masking for all teachers, staff, students ages 2 and older, and visitors to schools, regardless of vaccination status.  · Like other viruses, the virus that causes COVID-19 changes (mutates) all the time. This leads to variants that are easily spread, including the delta variant. To protect against variants, CDC advises all people over age 2, including those who are fully vaccinated, to wear a mask indoors in public settings if you are in an area of high numbers of COVID-19 cases. See the CDC's county data website for current transmission information in your  area.     Certain people should not wear a face mask. This includes:  · Children younger than 2 years old  · Anyone with a health, developmental, or mental health condition that can be made worse by wearing a mask  · Anyone who is unconscious or unable to remove the face covering without help     See the CDC's mask guidance.  When to call your healthcare provider  Call your healthcare provider right away if a sick person has any of these:  · Trouble breathing  · Pain or pressure in chest  If a sick person has any of these, call 911:  · Trouble breathing that gets worse  · Pain or pressure in chest that gets worse  · Blue tint to lips or face  · Fast or irregular heartbeat  · Confusion or trouble waking  · Fainting or loss of consciousness  · Coughing up blood  Going home from the hospital  If you were diagnosed with COVID-19 and were recently discharged from the hospital:  · Follow the instructions above for self-care and isolation.  · Follow the hospital healthcare team’s specific instructions.  · Ask questions if anything is unclear to you. Write down answers so you remember them.  Date last modified: 11/3/2021  [employer] last reviewed this educational content on 4/1/2020    © 8234-6897 [employer], [address] [place] [address], [place] [address]. All rights reserved. This information is not intended as a substitute for professional medical care. Always follow your healthcare professional's instructions. This information has been modified by your health care provider with permission from the publisher.

## 2022-04-15 NOTE — PROGRESS NOTES
Pharmacy consulted to dose and follow vancomycin for pneumonia in this 52 y/o female patient.    Will initiate vancomycin 1000mg every 12 hours and check a trough before the 4th dose on 4/17.    Pharmacy will continue to monitor and make adjustments as needed.    Thank you for the consult,  Shannon Catalan, PharmD

## 2022-04-15 NOTE — ASSESSMENT & PLAN NOTE
Patient has pneumonia.  She is at high risk for pseudomonal infection given underlying structural lung changes.  Her lung changes are likely reflective of bullous emphysema that could be due to cavitary pneumonia.  Patient is on day 1 of 7 of Azactam, tobramycin, and vancomycin.

## 2022-04-15 NOTE — SUBJECTIVE & OBJECTIVE
Past Medical History:   Diagnosis Date    Arthritis     Asthma     COPD (chronic obstructive pulmonary disease)     Respiratory Failure, Hypoxia, SOB, home O2    GERD (gastroesophageal reflux disease)     Hypertension     Kidney stone     Pulmonary Mycobacterium avium complex (MAC) infection     4/2021    Tachycardia     Tobacco dependence        Past Surgical History:   Procedure Laterality Date    LITHOTRIPSY         Review of patient's allergies indicates:   Allergen Reactions    Magnesium sulfate     Pcn [penicillins]     Wellbutrin [bupropion hcl]        No current facility-administered medications on file prior to encounter.     Current Outpatient Medications on File Prior to Encounter   Medication Sig    albuterol sulfate 90 mcg/actuation aebs Inhale 180 mcg into the lungs as needed.    albuterol-ipratropium (DUO-NEB) 2.5 mg-0.5 mg/3 mL nebulizer solution USE 1 SOLUTION IN NEBULIZER 4 TIMES DAILY    ethambutoL (MYAMBUTOL) 400 MG Tab Take 800 mg by mouth once daily. 400 mg tab, 2 tab daily    fluticasone propionate (FLONASE) 50 mcg/actuation nasal spray 2 sprays by Each Nostril route as needed for Rhinitis.    fluticasone-salmeterol diskus inhaler 500-50 mcg Inhale 1 puff into the lungs 2 (two) times daily. Controller    furosemide (LASIX) 20 MG tablet Take 1 tablet (20 mg total) by mouth daily as needed.    hydrOXYzine pamoate (VISTARIL) 50 MG Cap Take 1 capsule (50 mg total) by mouth every 8 (eight) hours as needed.    ibuprofen 200 mg Cap Take 2 tablets by mouth as needed.    montelukast (SINGULAIR) 10 mg tablet Take 1 tablet (10 mg total) by mouth every evening.    nicotine (NICODERM CQ) 21 mg/24 hr Place 1 patch onto the skin every 24 hours.    oxymetazoline (AFRIN) 0.05 % nasal spray 2 sprays by Nasal route as needed for Congestion.    potassium 99 mg Tab Take 1 tablet by mouth once daily.    theophylline (THEODUR) 300 MG 12 hr tablet Take 1 tablet (300 mg total) by mouth 2 (two) times daily.     tiotropium (SPIRIVA) 18 mcg inhalation capsule Inhale 18 mcg into the lungs once daily. Controller     Family History       Problem Relation (Age of Onset)    Cancer Father, Paternal Grandfather    Diabetes Mother, Maternal Grandfather    Hyperlipidemia Father          Tobacco Use    Smoking status: Current Every Day Smoker     Packs/day: 0.50     Years: 35.00     Pack years: 17.50     Types: Cigarettes    Smokeless tobacco: Never Used   Substance and Sexual Activity    Alcohol use: Not Currently     Comment: socially    Drug use: Never    Sexual activity: Not on file     Review of Systems   Constitutional:  Positive for activity change and fatigue.   HENT:  Negative for congestion.    Eyes:  Negative for pain.   Respiratory:  Positive for cough, chest tightness, shortness of breath and wheezing.    Cardiovascular:  Negative for chest pain.   Gastrointestinal:  Negative for abdominal pain.   Genitourinary:  Negative for dysuria.   Skin:  Negative for rash.   Neurological:  Positive for weakness.   Psychiatric/Behavioral:  The patient is nervous/anxious.    Objective:     Vital Signs (Most Recent):  Temp:  (BACK FROM CT) (04/15/22 0745)  Pulse: (!) 117 (04/15/22 0900)  Resp: 16 (04/15/22 0900)  BP: (!) 107/59 (04/15/22 0900)  SpO2: 96 % (04/15/22 0900)   Vital Signs (24h Range):  Temp:  [99 °F (37.2 °C)] 99 °F (37.2 °C)  Pulse:  [117-145] 117  Resp:  [16-30] 16  SpO2:  [94 %-97 %] 96 %  BP: (105-123)/(59-78) 107/59     Weight: 43.5 kg (96 lb)  Body mass index is 16.48 kg/m².    Physical Exam  Constitutional:       General: She is in acute distress.      Appearance: She is ill-appearing. She is not toxic-appearing or diaphoretic.   HENT:      Head: Normocephalic and atraumatic.   Eyes:      General: No scleral icterus.        Right eye: No discharge.         Left eye: No discharge.      Conjunctiva/sclera: Conjunctivae normal.   Cardiovascular:      Rate and Rhythm: Tachycardia present.      Heart sounds: Normal heart  sounds. No murmur heard.    No friction rub. No gallop.   Pulmonary:      Effort: Respiratory distress present.      Breath sounds: No stridor. Wheezing and rhonchi present. No rales.   Chest:      Chest wall: No tenderness.   Abdominal:      General: Abdomen is flat. Bowel sounds are normal. There is no distension.      Palpations: Abdomen is soft. There is no mass.      Tenderness: There is no abdominal tenderness. There is no guarding or rebound.      Hernia: No hernia is present.   Skin:     General: Skin is warm and dry.      Coloration: Skin is not jaundiced or pale.      Findings: No bruising, erythema, lesion or rash.   Neurological:      Mental Status: She is alert.   Psychiatric:      Comments: Anxious appearing           Significant Labs: All pertinent labs within the past 24 hours have been reviewed.  CBC:   Recent Labs   Lab 04/15/22  0647   WBC 18.96*   HGB 12.3   HCT 39.7   *     CMP:   Recent Labs   Lab 04/15/22  0647   *   K 4.8   CL 87*   CO2 42*   *   BUN 19*   CREATININE 0.50*   CALCIUM 8.9   PROT 7.0   ALBUMIN 2.3*   BILITOT 0.2   ALKPHOS 183*   AST 18   ALT 21   ANIONGAP 8   EGFRNONAA 138       Significant Imaging: I have reviewed all pertinent imaging results/findings within the past 24 hours.

## 2022-04-15 NOTE — PROGRESS NOTES
Pharmacy consulted to dose and follow tobramycin for pneumonia (possible PSA) in this 52 y/o female patient.    Will initiate tobramycin 240mg every 24 hours. Trough ordered before tomorrow's dose (2nd dose).    Pharmacy will continue to monitor and make adjustments as needed.    Thank you for the consult,  Shannon Catalan, PharmD

## 2022-04-15 NOTE — PT/OT/SLP EVAL
Physical Therapy Evaluation    Patient Name:  Yun Quintanilla   MRN:  [id number]    Recommendations:     Discharge Recommendations:  home health PT   Discharge Equipment Recommendations: walker, rolling   Barriers to discharge: None    Assessment:     Yun Quintanilla is a 51 y.o. female admitted with a medical diagnosis of <principal problem not specified>.  She presents with the following impairments/functional limitations:  weakness, gait instability, impaired balance, impaired functional mobilty, impaired endurance .    Rehab Prognosis: Good; patient would benefit from acute skilled PT services to address these deficits and reach maximum level of function.    Recent Surgery: * No surgery found *      Plan:     During this hospitalization, patient to be seen 5 x/week to address the identified rehab impairments via gait training, therapeutic activities, therapeutic exercises, neuromuscular re-education and progress toward the following goals:    · Plan of Care Expires:  05/27/22    Subjective     Chief Complaint: Patient was asleep upon arrival but was able to be aroused  Patient/Family Comments/goals:   Pain/Comfort:  · Pain Rating 1: 0/10    Patients cultural, spiritual, Hindu conflicts given the current situation: no    Living Environment:  Patient lives with her niece and nephew.  Prior to admission, patients level of function was independent.  Equipment used at home: none.  DME owned (not currently used): none.  Upon discharge, patient will have assistance from family.    Objective:     Communicated with nursing staff prior to session.  Patient found supine with    upon PT entry to room.    General Precautions: Standard, fall   Orthopedic Precautions:N/A   Braces: N/A  Respiratory Status: Nasal cannula, flow 2 L/min    Exams:  · RLE ROM: WFL  · RLE Strength: Deficits: 3/5 gross  · LLE ROM: WFL  · LLE Strength: Deficits: 3/5 gross    Functional Mobility:  · Bed Mobility:     · Rolling Left:  modified  independence  · Rolling Right: modified independence  · Supine to Sit: modified independence  · Transfers:     · Sit to Stand:  minimum assistance with rolling walker  · Bed to Chair: minimum assistance with  rolling walker  using  Step Transfer  · Gait: Unwilling to ambulate at time of rehab due to unsteadiness.    AM-PAC 6 CLICK MOBILITY  Total Score:16     Patient left supine with call button in reach.    GOALS:   Multidisciplinary Problems     Physical Therapy Goals        Problem: Physical Therapy    Goal Priority Disciplines Outcome Goal Variances Interventions   Physical Therapy Goal     PT, PT/OT Ongoing, Progressing     Description: Goals to be met by: Discharge     Patient will increase functional independence with mobility by performin. Sit to stand transfer with Modified [place]  2. Bed to chair transfer with Modified [place] using No Assistive Device  3. Gait  x 200 feet with [place] using No Assistive Device.   4. Increased functional strength to 4/5 for BLE demonstrating 7 stands in 30 seconds unassisted.                     History:     Past Medical History:   Diagnosis Date    Arthritis     Asthma     COPD (chronic obstructive pulmonary disease)     Respiratory Failure, Hypoxia, SOB, home O2    GERD (gastroesophageal reflux disease)     Hypertension     Kidney stone     Pulmonary Mycobacterium avium complex (MAC) infection     2021    Tachycardia     Tobacco dependence        Past Surgical History:   Procedure Laterality Date    LITHOTRIPSY         Time Tracking:     PT Received On: 04/15/22  PT Start Time: 1410     PT Stop Time: 1427  PT Total Time (min): 17 min     Billable Minutes: Evaluation 17      04/15/2022

## 2022-04-15 NOTE — ASSESSMENT & PLAN NOTE
Patient has severe COPD.  She is currently on theophyllin and a number of inhalers.  I believe that this pneumonia has triggered a COPD exacerbation.  I will check a theophyllin level.  I started her on Xopenex q.4 scheduled, and Solu-Medrol 125 mg IV q.6.  I will begin to titrate her steroids down in the coming days.  I anticipate that her respiratory status will improve as her pneumonia is treated.  I have also consulted Dr. Lozano for inpatient evaluation.

## 2022-04-15 NOTE — PLAN OF CARE
Care plan reviewed  Problem: Adult Inpatient Plan of Care  Goal: Plan of Care Review  Outcome: Ongoing, Progressing  Goal: Patient-Specific Goal (Individualized)  Outcome: Ongoing, Progressing  Goal: Absence of Hospital-Acquired Illness or Injury  Outcome: Ongoing, Progressing  Goal: Optimal Comfort and Wellbeing  Outcome: Ongoing, Progressing  Goal: Readiness for Transition of Care  Outcome: Ongoing, Progressing     Problem: Skin Injury Risk Increased  Goal: Skin Health and Integrity  Outcome: Ongoing, Progressing     Problem: Adjustment to Illness (Sepsis/Septic Shock)  Goal: Optimal Coping  Outcome: Ongoing, Progressing     Problem: Bleeding (Sepsis/Septic Shock)  Goal: Absence of Bleeding  Outcome: Ongoing, Progressing     Problem: Glycemic Control Impaired (Sepsis/Septic Shock)  Goal: Blood Glucose Level Within Desired Range  Outcome: Ongoing, Progressing     Problem: Infection Progression (Sepsis/Septic Shock)  Goal: Absence of Infection Signs and Symptoms  Outcome: Ongoing, Progressing     Problem: Nutrition Impaired (Sepsis/Septic Shock)  Goal: Optimal Nutrition Intake  Outcome: Ongoing, Progressing     Problem: Fluid Imbalance (Pneumonia)  Goal: Fluid Balance  Outcome: Ongoing, Progressing     Problem: Infection (Pneumonia)  Goal: Resolution of Infection Signs and Symptoms  Outcome: Ongoing, Progressing     Problem: Respiratory Compromise (Pneumonia)  Goal: Effective Oxygenation and Ventilation  Outcome: Ongoing, Progressing     Problem: Adjustment to Illness COPD (Chronic Obstructive Pulmonary Disease)  Goal: Optimal Chronic Illness Coping  Outcome: Ongoing, Progressing     Problem: Functional Ability Impaired COPD (Chronic Obstructive Pulmonary Disease)  Goal: Optimal Level of Functional [place]  Outcome: Ongoing, Progressing     Problem: Infection COPD (Chronic Obstructive Pulmonary Disease)  Goal: Absence of Infection Signs and Symptoms  Outcome: Ongoing, Progressing     Problem: Oral Intake  Inadequate COPD (Chronic Obstructive Pulmonary Disease)  Goal: Improved Nutrition Intake  Outcome: Ongoing, Progressing     Problem: Respiratory Compromise COPD (Chronic Obstructive Pulmonary Disease)  Goal: Effective Oxygenation and Ventilation  Outcome: Ongoing, Progressing

## 2022-04-15 NOTE — HPI
Patient is a 51-year-old white female with a past medical history of severe COPD requiring home oxygen.  Patient was discharged from [facility] in the last week after being given inpatient treatment for COPD exacerbation and pneumonia.  After discharge home the patient's oxygen compressor malfunctioned.  Patient went into respiratory distress and called EMS who brought her to our facility.  On presentation patient is tachycardic, tachypneic, and hypoxic.  Oxygen saturation eugenia above 90% on 6 L by simple mask.  Tachypnea improved with Solu-Medrol and Xopenex.  Tachycardia improved with Ativan.  CT angiogram of the chest revealed no evidence of pulmonary embolus but did show a consolidation, and structural lung changes that could either be representative of a bullous emphysema or cavitary pneumonia.  Patient's lactic acid was elevated.  In the ER patient was given 1 L of IV fluid and Azactam 1g. Patient admitted for inpatient management of severe sepsis with acute respiratory failure, pneumonia, and COPD exacerbation.

## 2022-04-15 NOTE — ED NOTES
PT TRANSFERRED TO BED IN ROOM 105. CONNECTED TO DYNAMAP AT BEDSIDE. PT LEFT ON 3L/NC. O2 SATS 94-96%. 'S. /76. PT LEFT IN NAD. BEDSIDE RN MADE AWARE PT IN ROOM.

## 2022-04-15 NOTE — ASSESSMENT & PLAN NOTE
Patient has severe sepsis with acute hypoxic respiratory failure.  This is secondary to pneumonia.  Patient failed out patient therapy with Levaquin.  She was also hospitalized this last week.  I placed her on Azactam, vancomycin, and tobramycin and collected blood cultures.  She is on day 1 of 7 of IV antibiotic therapy. She received 1 L of IV fluids in the ER.  I will continue her on IV fluids running at 75 cc/hour for 2 days.  Patient was hypotensive on presentation with this as since resolved.  She has good p.o. intake.

## 2022-04-16 NOTE — ASSESSMENT & PLAN NOTE
Patient's respiratory failure is improving with the Solu-Medrol, and Xopenex.  I will begin tapering her Solu-Medrol today.  I will continue tapering it tomorrow if she continues to improve.  Pulmonology to see the patient on Tuesday.

## 2022-04-16 NOTE — RESPIRATORY THERAPY
Latest Reference Range & Units 04/17/21 04:35 04/18/21 04:45 04/19/21 04:32 04/15/22 06:47   WBC 4.50 - 11.00 K/uL 11.38 (H) 13.43 (H) 9.01 18.96 (H)   RBC 4.20 - 5.40 M/uL 4.56 4.17 (L) 4.00 (L) 4.29   Hemoglobin 12.0 - 16.0 g/dL 12.5 11.4 (L) 10.9 (L) 12.3   Hematocrit 38.0 - 47.0 % 38.1 35.2 (L) 34.2 (L) 39.7   MCV 80.0 - 96.0 fL 84 84 86 92.5   MCH 27.0 - 31.0 pg 27.4 27.3 27.3 28.7   MCHC 32.0 - 36.0 g/dL 32.8 32.4 31.9 (L) 31.0 (L)   RDW 11.5 - 14.5 % 18.3 (H) 18.3 (H) 18.4 (H) 15.4 (H)   Platelets 150 - 400 K/uL 418 (H) 427 (H) 383 460 (H)   MPV 9.4 - 12.4 fL 9.0 (L) 9.1 (L) 9.1 (L) 8.6 (L)   Neutrophils Relative 53.0 - 65.0 % 91.5 (H) 90.3 (H) 80.2 (H) 93.6 (H)   Lymph % 27.0 - 41.0 %  27 - 41 % 5.8 (L)  7 (L) 5.6 (L)  5 (L) 13.8 (L) 1.8 (L)  2 (L)   Mono % 2.0 - 6.0 %  2 - 6 % 2.6  4 [1] 4.0  2 [2] 6.0 4.4  3   Eosinophil % 1.0 - 4.0 % 0.0 (L) 0.0 (L) 0.0 (L) 0.1 (L)   Basophil % 0.0 - 1.0 % 0.1 0.1 0.0 0.1   Neutrophils, Abs 1.80 - 7.70 K/uL 10.41 (H) 12.13 (H) 7.23 17.74 (H)   Lymph # 1.00 - 4.80 K/uL 0.66 (L) 0.75 (L) 1.24 0.35 (L)   Mono # 0.00 - 0.80 K/uL 0.30 0.54 0.54 0.84 (H)   Eos # 0.00 - 0.50 K/uL 0.00 0.00 0.00 0.01   Baso # 0.00 - 0.20 K/uL 0.01 0.01 0.00 [3] 0.02   Segmented Neutrophils, Man % 50 - 62 % 89 (H) 93 (H)  79 (H)   Bands 1 - 5 %    16 (H)   Differential Type     Manual   (H): Data is abnormally high  (L): Data is abnormally low  [1] The above 23 analytes were performed by [place] [id number]  [2] The above 23 analytes were performed [place] [id number]  [3] The above 20 analytes were performed by [place] [id number]  Details    Reading Physician Reading Date Result Priority   Maksim OSEGUERA LakshmiDO  [phone] 4/15/2022 STAT     Narrative & Impression  EXAMINATION:  XR CHEST 1 VIEW     CLINICAL HISTORY:  Chronic obstructive pulmonary disease with (acute) exacerbation     TECHNIQUE:  XR CHEST 1  VIEW     COMPARISON:  Comparisons were reviewed, if available.     FINDINGS:  No lines or tubes.     Increased consolidation left upper lobe, developing pneumonia is a concern     Pleura is similar to comparison     Cardiac silhouette is similar to comparison exam.     No new acute bone findings.     Impression:     As above.        Electronically signed by: Maksim Martins  Date:                                            04/15/2022  Time:                                           08:35  Temp 98.8  Respirations 22  Pulse 111  /68

## 2022-04-16 NOTE — PROGRESS NOTES
[facility] Surgical Unit  [facility] Medicine  Progress Note    Patient Name: Yun Quintanilla  MRN: [id number]  Patient Class: IP- Inpatient   Admission Date: 4/15/2022  Length of Stay: 1 days  Attending Physician: Negrito Menendez DO  Primary Care Provider: Imani Renae MD        Subjective:     Principal Problem:<principal problem not specified>        HPI:  Patient is a 51-year-old white female with a past medical history of severe COPD requiring home oxygen.  Patient was discharged from [facility] in the last week after being given inpatient treatment for COPD exacerbation and pneumonia.  After discharge home the patient's oxygen compressor malfunctioned.  Patient went into respiratory distress and called EMS who brought her to our facility.  On presentation patient is tachycardic, tachypneic, and hypoxic.  Oxygen saturation eugenia above 90% on 6 L by simple mask.  Tachypnea improved with Solu-Medrol and Xopenex.  Tachycardia improved with Ativan.  CT angiogram of the chest revealed no evidence of pulmonary embolus but did show a consolidation, and structural lung changes that could either be representative of a bullous emphysema or cavitary pneumonia.  Patient's lactic acid was elevated.  In the ER patient was given 1 L of IV fluid and Azactam 1g. Patient admitted for inpatient management of severe sepsis with acute respiratory failure, pneumonia, and COPD exacerbation.      Overview/Hospital Course:  04/16/2022:  Today patient reports that she feels better.  She denies any issues with her breathing.  Nursing reports no new complaints.      Interval History: Today patient reports that she feels better.  She denies any issues with her breathing.  Nursing reports no new complaints.    Review of Systems   Constitutional:  Positive for activity change and fatigue.   HENT:  Negative for congestion.    Eyes:  Negative for pain.   Respiratory:  Positive for cough, chest tightness, shortness of breath and wheezing.     Cardiovascular:  Negative for chest pain.   Gastrointestinal:  Negative for abdominal pain.   Genitourinary:  Negative for dysuria.   Skin:  Negative for rash.   Neurological:  Positive for weakness.   Psychiatric/Behavioral:  The patient is nervous/anxious.    Objective:     Vital Signs (Most Recent):  Temp: 98 °F (36.7 °C) (04/16/22 0800)  Pulse: (!) 115 (04/16/22 0800)  Resp: 17 (04/16/22 0800)  BP: 105/64 (04/16/22 0800)  SpO2: 95 % (04/16/22 0800)   Vital Signs (24h Range):  Temp:  [97.9 °F (36.6 °C)-99.2 °F (37.3 °C)] 98 °F (36.7 °C)  Pulse:  [] 115  Resp:  [16-22] 17  SpO2:  [22 %-98 %] 95 %  BP: ()/(61-68) 105/64     Weight: 44.1 kg (97 lb 3.2 oz)  Body mass index is 16.68 kg/m².    Intake/Output Summary (Last 24 hours) at 4/16/2022 1038  Last data filed at 4/16/2022 0354  Gross per 24 hour   Intake 1100.5 ml   Output --   Net 1100.5 ml      Physical Exam  Vitals and nursing note reviewed.   Constitutional:       General: She is not in acute distress.     Appearance: She is ill-appearing. She is not toxic-appearing or diaphoretic.      Comments: Cachectic   HENT:      Head: Normocephalic and atraumatic.   Eyes:      General: No scleral icterus.        Right eye: No discharge.         Left eye: No discharge.      Conjunctiva/sclera: Conjunctivae normal.   Cardiovascular:      Rate and Rhythm: Tachycardia present.      Heart sounds: Normal heart sounds. No murmur heard.    No friction rub. No gallop.   Pulmonary:      Effort: No respiratory distress.      Breath sounds: No stridor. No wheezing, rhonchi or rales.   Chest:      Chest wall: No tenderness.   Abdominal:      General: Abdomen is flat. Bowel sounds are normal. There is no distension.      Palpations: Abdomen is soft. There is no mass.      Tenderness: There is no abdominal tenderness. There is no guarding or rebound.      Hernia: No hernia is present.   Skin:     General: Skin is warm and dry.      Coloration: Skin is not jaundiced or  pale.      Findings: No bruising, erythema, lesion or rash.   Neurological:      Mental Status: She is alert.   Psychiatric:      Comments: Anxious appearing       Significant Labs: All pertinent labs within the past 24 hours have been reviewed.  BMP:   Recent Labs   Lab 04/16/22  0441   *   *   K 3.8   CL 93*   CO2 37*   BUN 17   CREATININE 0.51*   CALCIUM 8.7     CBC:   Recent Labs   Lab 04/15/22  0647 04/16/22  0441   WBC 18.96* 15.66*   HGB 12.3 11.3*   HCT 39.7 35.0*   * 504*       Significant Imaging: I have reviewed all pertinent imaging results/findings within the past 24 hours.      Assessment/Plan:      Acute hypoxemic respiratory failure  Patient's respiratory failure is improving with the Solu-Medrol, and Xopenex.  I will begin tapering her Solu-Medrol today.  I will continue tapering it tomorrow if she continues to improve.  Pulmonology to see the patient on Tuesday.    Pneumonia due to infectious organism  As above      Sepsis with acute hypoxic respiratory failure without septic shock  Patient's condition has improved with IV steroids, breathing treatments, and IV antibiotics.  She is on day 2 of 7 on of tobramycin, vancomycin, and Azactam.  I will continue her IV fluids for 1 more day.  Plan for swing bed placement on Monday.        VTE Risk Mitigation (From admission, onward)         Ordered     enoxaparin injection 40 mg  Daily         04/15/22 1250     IP VTE HIGH RISK PATIENT  Once         04/15/22 1250                Discharge Planning   SHANTHI:      Code Status: Full Code   Is the patient medically ready for discharge?:     Reason for patient still in hospital (select all that apply): Patient trending condition and Treatment                     Negrito Menendez DO  Department of Hospital Medicine   [place] - Medical Surgical Unit

## 2022-04-16 NOTE — HOSPITAL COURSE
04/16/2022:  Today patient reports that she feels better.  She denies any issues with her breathing.  Nursing reports no new complaints.    04/17/2022:  Today patient reports that she wants to go home.  She reports that she feels better and that she can sleep.  I offered to give her medication to help her with sleep.  I explained to her that she is on 3 antibiotics and that she failed management on outpatient antibiotics.  I stressed to her the acuity of her condition and the risks associated with leaving.  In spite of this she insisted that she would sign out against medical advice.  Again I explained to her the risks of her decision.  In spite of this discussion she persisted and decided to sign out against medical advice.

## 2022-04-16 NOTE — SUBJECTIVE & OBJECTIVE
Interval History: Today patient reports that she feels better.  She denies any issues with her breathing.  Nursing reports no new complaints.    Review of Systems   Constitutional:  Positive for activity change and fatigue.   HENT:  Negative for congestion.    Eyes:  Negative for pain.   Respiratory:  Positive for cough, chest tightness, shortness of breath and wheezing.    Cardiovascular:  Negative for chest pain.   Gastrointestinal:  Negative for abdominal pain.   Genitourinary:  Negative for dysuria.   Skin:  Negative for rash.   Neurological:  Positive for weakness.   Psychiatric/Behavioral:  The patient is nervous/anxious.    Objective:     Vital Signs (Most Recent):  Temp: 98 °F (36.7 °C) (04/16/22 0800)  Pulse: (!) 115 (04/16/22 0800)  Resp: 17 (04/16/22 0800)  BP: 105/64 (04/16/22 0800)  SpO2: 95 % (04/16/22 0800)   Vital Signs (24h Range):  Temp:  [97.9 °F (36.6 °C)-99.2 °F (37.3 °C)] 98 °F (36.7 °C)  Pulse:  [] 115  Resp:  [16-22] 17  SpO2:  [22 %-98 %] 95 %  BP: ()/(61-68) 105/64     Weight: 44.1 kg (97 lb 3.2 oz)  Body mass index is 16.68 kg/m².    Intake/Output Summary (Last 24 hours) at 4/16/2022 1038  Last data filed at 4/16/2022 0354  Gross per 24 hour   Intake 1100.5 ml   Output --   Net 1100.5 ml      Physical Exam  Vitals and nursing note reviewed.   Constitutional:       General: She is not in acute distress.     Appearance: She is ill-appearing. She is not toxic-appearing or diaphoretic.      Comments: Cachectic   HENT:      Head: Normocephalic and atraumatic.   Eyes:      General: No scleral icterus.        Right eye: No discharge.         Left eye: No discharge.      Conjunctiva/sclera: Conjunctivae normal.   Cardiovascular:      Rate and Rhythm: Tachycardia present.      Heart sounds: Normal heart sounds. No murmur heard.    No friction rub. No gallop.   Pulmonary:      Effort: No respiratory distress.      Breath sounds: No stridor. No wheezing, rhonchi or rales.   Chest:       Chest wall: No tenderness.   Abdominal:      General: Abdomen is flat. Bowel sounds are normal. There is no distension.      Palpations: Abdomen is soft. There is no mass.      Tenderness: There is no abdominal tenderness. There is no guarding or rebound.      Hernia: No hernia is present.   Skin:     General: Skin is warm and dry.      Coloration: Skin is not jaundiced or pale.      Findings: No bruising, erythema, lesion or rash.   Neurological:      Mental Status: She is alert.   Psychiatric:      Comments: Anxious appearing       Significant Labs: All pertinent labs within the past 24 hours have been reviewed.  BMP:   Recent Labs   Lab 04/16/22  0441   *   *   K 3.8   CL 93*   CO2 37*   BUN 17   CREATININE 0.51*   CALCIUM 8.7     CBC:   Recent Labs   Lab 04/15/22  0647 04/16/22  0441   WBC 18.96* 15.66*   HGB 12.3 11.3*   HCT 39.7 35.0*   * 504*       Significant Imaging: I have reviewed all pertinent imaging results/findings within the past 24 hours.

## 2022-04-16 NOTE — PLAN OF CARE
Care plan reviewed  Problem: Adjustment to Illness COPD (Chronic Obstructive Pulmonary Disease)  Goal: Optimal Chronic Illness Coping  Outcome: Ongoing, Progressing     Problem: Functional Ability Impaired COPD (Chronic Obstructive Pulmonary Disease)  Goal: Optimal Level of Functional [place]  Outcome: Ongoing, Progressing     Problem: Infection COPD (Chronic Obstructive Pulmonary Disease)  Goal: Absence of Infection Signs and Symptoms  Outcome: Ongoing, Progressing     Problem: Oral Intake Inadequate COPD (Chronic Obstructive Pulmonary Disease)  Goal: Improved Nutrition Intake  Outcome: Ongoing, Progressing     Problem: Respiratory Compromise COPD (Chronic Obstructive Pulmonary Disease)  Goal: Effective Oxygenation and Ventilation  Outcome: Ongoing, Progressing

## 2022-04-16 NOTE — ASSESSMENT & PLAN NOTE
Patient's condition has improved with IV steroids, breathing treatments, and IV antibiotics.  She is on day 2 of 7 on of tobramycin, vancomycin, and Azactam.  I will continue her IV fluids for 1 more day.  Plan for swing bed placement on Monday.

## 2022-04-17 NOTE — RESPIRATORY THERAPY
Pt stated that she wanted VM placed on her that she thought that it might work better. Pt was c/o cynthia. Pt was told that if she felt as though she was abiut to vomit that she must take the mask off to avoid aspiration.02 % 30 at 6 L.

## 2022-04-17 NOTE — NURSING
In room to round with Dr Menendez. Patient informed Dr Menendez that she wants to be discharged home today. Dr Menendez instructed patient that she is very ill and needs to be treated with IV antibiotics and advises against discharge at this time. Patient states that she is leaving AMA today. Correct paper work signed.

## 2022-04-17 NOTE — RESPIRATORY THERAPY
Discharge Notice    Patient left AMA at 11:30 after doctor talked to her advising her she shouldn't leave.  Patient was on a venti-mask at 30% FIO2.

## 2022-04-17 NOTE — PLAN OF CARE
Care plan reviewed. Patient left AMA  Problem: Adult Inpatient Plan of Care  Goal: Plan of Care Review  Outcome: Ongoing, Not Progressing  Goal: Patient-Specific Goal (Individualized)  Outcome: Ongoing, Not Progressing  Goal: Absence of Hospital-Acquired Illness or Injury  Outcome: Ongoing, Not Progressing  Goal: Optimal Comfort and Wellbeing  Outcome: Ongoing, Not Progressing  Goal: Readiness for Transition of Care  Outcome: Ongoing, Not Progressing     Problem: Skin Injury Risk Increased  Goal: Skin Health and Integrity  Outcome: Ongoing, Not Progressing     Problem: Adjustment to Illness (Sepsis/Septic Shock)  Goal: Optimal Coping  Outcome: Ongoing, Not Progressing     Problem: Bleeding (Sepsis/Septic Shock)  Goal: Absence of Bleeding  Outcome: Ongoing, Not Progressing     Problem: Glycemic Control Impaired (Sepsis/Septic Shock)  Goal: Blood Glucose Level Within Desired Range  Outcome: Ongoing, Not Progressing     Problem: Infection Progression (Sepsis/Septic Shock)  Goal: Absence of Infection Signs and Symptoms  Outcome: Ongoing, Not Progressing     Problem: Nutrition Impaired (Sepsis/Septic Shock)  Goal: Optimal Nutrition Intake  Outcome: Ongoing, Not Progressing     Problem: Fluid Imbalance (Pneumonia)  Goal: Fluid Balance  Outcome: Ongoing, Not Progressing     Problem: Infection (Pneumonia)  Goal: Resolution of Infection Signs and Symptoms  Outcome: Ongoing, Not Progressing     Problem: Respiratory Compromise (Pneumonia)  Goal: Effective Oxygenation and Ventilation  Outcome: Ongoing, Not Progressing     Problem: Adjustment to Illness COPD (Chronic Obstructive Pulmonary Disease)  Goal: Optimal Chronic Illness Coping  Outcome: Ongoing, Not Progressing     Problem: Functional Ability Impaired COPD (Chronic Obstructive Pulmonary Disease)  Goal: Optimal Level of Functional [place]  Outcome: Ongoing, Not Progressing     Problem: Infection COPD (Chronic Obstructive Pulmonary Disease)  Goal: Absence of Infection  Signs and Symptoms  Outcome: Ongoing, Not Progressing     Problem: Oral Intake Inadequate COPD (Chronic Obstructive Pulmonary Disease)  Goal: Improved Nutrition Intake  Outcome: Ongoing, Not Progressing     Problem: Respiratory Compromise COPD (Chronic Obstructive Pulmonary Disease)  Goal: Effective Oxygenation and Ventilation  Outcome: Ongoing, Not Progressing

## 2022-04-17 NOTE — PROGRESS NOTES
Tobramycin trough resulted <0.3 yesterday. Patient's WBC coming down so no changes at this time.    Pharmacy will continue to monitor and make adjustments as needed.    Shannon Catalan, BurakD

## 2022-04-17 NOTE — PLAN OF CARE
Problem: Adult Inpatient Plan of Care  Goal: Plan of Care Review  Outcome: Ongoing, Progressing     Problem: Adult Inpatient Plan of Care  Goal: Patient-Specific Goal (Individualized)  Outcome: Ongoing, Progressing     Problem: Adult Inpatient Plan of Care  Goal: Optimal Comfort and Wellbeing  Outcome: Ongoing, Progressing     Problem: Skin Injury Risk Increased  Goal: Skin Health and Integrity  Outcome: Ongoing, Progressing     Problem: Respiratory Compromise COPD (Chronic Obstructive Pulmonary Disease)  Goal: Effective Oxygenation and Ventilation  Outcome: Ongoing, Progressing

## 2022-04-17 NOTE — PLAN OF CARE
Patient's SAT this AM was 93% on VM with FIO2       Problem: Infection (Pneumonia)  Goal: Resolution of Infection Signs and Symptoms  Outcome: Ongoing, Progressing     Problem: Respiratory Compromise (Pneumonia)  Goal: Effective Oxygenation and Ventilation  Outcome: Ongoing, Progressing     Problem: Adjustment to Illness COPD (Chronic Obstructive Pulmonary Disease)  Goal: Optimal Chronic Illness Coping  Outcome: Ongoing, Progressing     Problem: Respiratory Compromise COPD (Chronic Obstructive Pulmonary Disease)  Goal: Effective Oxygenation and Ventilation  Outcome: Ongoing, Progressing

## 2022-04-17 NOTE — DISCHARGE SUMMARY
[facility] Medical Surgical Unit  Hospital Medicine  Discharge Summary      Patient Name: Yun Quintanilla  MRN: [id number]  Patient Class: IP- Inpatient  Admission Date: 4/15/2022  Hospital Length of Stay: 2 days  Discharge Date and Time:  04/17/2022 10:05 AM  Attending Physician: Negrito Menendez DO   Discharging Provider: Negrito Menendez DO  Primary Care Provider: Imani Renae MD      HPI:   Patient is a 51-year-old white female with a past medical history of severe COPD requiring home oxygen.  Patient was discharged from [facility] in the last week after being given inpatient treatment for COPD exacerbation and pneumonia.  After discharge home the patient's oxygen compressor malfunctioned.  Patient went into respiratory distress and called EMS who brought her to our facility.  On presentation patient is tachycardic, tachypneic, and hypoxic.  Oxygen saturation eugenia above 90% on 6 L by simple mask.  Tachypnea improved with Solu-Medrol and Xopenex.  Tachycardia improved with Ativan.  CT angiogram of the chest revealed no evidence of pulmonary embolus but did show a consolidation, and structural lung changes that could either be representative of a bullous emphysema or cavitary pneumonia.  Patient's lactic acid was elevated.  In the ER patient was given 1 L of IV fluid and Azactam 1g. Patient admitted for inpatient management of severe sepsis with acute respiratory failure, pneumonia, and COPD exacerbation.      * No surgery found *      Hospital Course:   04/16/2022:  Today patient reports that she feels better.  She denies any issues with her breathing.  Nursing reports no new complaints.    04/17/2022:  Today patient reports that she wants to go home.  She reports that she feels better and that she can sleep.  I offered to give her medication to help her with sleep.  I explained to her that she is on 3 antibiotics and that she failed management on outpatient antibiotics.  I stressed to her the acuity of her  condition and the risks associated with leaving.  In spite of this she insisted that she would sign out against medical advice.  Again I explained to her the risks of her decision.  In spite of this discussion she persisted and decided to sign out against medical advice.       Goals of Care Treatment Preferences:  Code Status: Full Code      Consults:   Consults (From admission, onward)        Status Ordering Provider     Pharmacy to dose Vancomycin consult  Once        Provider:  (Not yet assigned)    Acknowledged SHERIDAN FRANCE     Pharmacy to dose Aminoglycosides consult  Once        Provider:  (Not yet assigned)    Acknowledged SHERIDAN FRANCE     Inpatient consult to Hospitalist  Once        Provider:  Johnie Lozano MD    Acknowledged SHERIDAN FRANCE          No new Assessment & Plan notes have been filed under this hospital service since the last note was generated.  Service: Hospital Medicine    Final Active Diagnoses:    Diagnosis Date Noted POA    Sepsis with acute hypoxic respiratory failure without septic shock [A41.9, R65.20, J96.01] 04/15/2022 Yes    Pneumonia due to infectious organism [J18.9] 04/15/2022 Yes    Acute hypoxemic respiratory failure [J96.01] 04/15/2022 Yes      Problems Resolved During this Admission:       Discharged Condition: stable    Disposition:     Follow Up:    Patient Instructions:   No discharge procedures on file.    Significant Diagnostic Studies: Labs:   BMP:   Recent Labs   Lab 04/16/22  0441 04/17/22  0140   * 99   * 135*   K 3.8 3.8   CL 93* 95*   CO2 37* 35*   BUN 17 15   CREATININE 0.51* 0.55   CALCIUM 8.7 8.6       Pending Diagnostic Studies:     None         Medications:  Reconciled Home Medications:      Medication List      ASK your doctor about these medications    albuterol sulfate 90 mcg/actuation Aebs  Inhale 180 mcg into the lungs as needed.     albuterol-ipratropium 2.5 mg-0.5 mg/3 mL nebulizer solution  Commonly known as:  DUO-NEB  USE 1 SOLUTION IN NEBULIZER 4 TIMES DAILY     ethambutoL 400 MG Tab  Commonly known as: MYAMBUTOL  Take 800 mg by mouth once daily. 400 mg tab, 2 tab daily     fluticasone propionate 50 mcg/actuation nasal spray  Commonly known as: FLONASE  2 sprays by Each Nostril route as needed for Rhinitis.     fluticasone-salmeterol 500-50 mcg/dose 500-50 mcg/dose Dsdv diskus inhaler  Commonly known as: ADVAIR  Inhale 1 puff into the lungs 2 (two) times daily. Controller     furosemide 20 MG tablet  Commonly known as: LASIX  Take 1 tablet (20 mg total) by mouth daily as needed.     hydrOXYzine pamoate 50 MG Cap  Commonly known as: VISTARIL  Take 1 capsule (50 mg total) by mouth every 8 (eight) hours as needed.     ibuprofen 200 mg Cap  Take 2 tablets by mouth as needed.     montelukast 10 mg tablet  Commonly known as: SINGULAIR  Take 1 tablet (10 mg total) by mouth every evening.     nicotine 21 mg/24 hr  Commonly known as: NICODERM CQ  Place 1 patch onto the skin every 24 hours.     oxymetazoline 0.05 % nasal spray  Commonly known as: AFRIN  2 sprays by Nasal route as needed for Congestion.     potassium 99 mg Tab  Take 1 tablet by mouth once daily.     theophylline 300 MG 12 hr tablet  Commonly known as: THEODUR  Take 1 tablet (300 mg total) by mouth 2 (two) times daily.     tiotropium 18 mcg inhalation capsule  Commonly known as: SPIRIVA  Inhale 18 mcg into the lungs once daily. Controller            Indwelling Lines/Drains at time of discharge:   Lines/Drains/Airways     None                 Time spent on the discharge of patient: 35 minutes         Negrito Menendez,   Department of Hospital Medicine  [place] - [facility] Surgical Unit

## 2022-04-17 NOTE — PROGRESS NOTES
Vancomycin trough resulted 11.5 yesterday. WBC is on downward trend so no changes at this time.    Pharmacy will continue to monitor and make adjustments as needed.    Shannon Catalan, PharmD

## 2022-04-17 NOTE — NURSING
Patient refusing to eat breakfast this AM. States the smell of food makes her sick. Also states that she wants to speak with Dr Menendez about being discharged home today. Will inform Dr Menendez.